# Patient Record
Sex: MALE | Race: WHITE | NOT HISPANIC OR LATINO | Employment: FULL TIME | URBAN - METROPOLITAN AREA
[De-identification: names, ages, dates, MRNs, and addresses within clinical notes are randomized per-mention and may not be internally consistent; named-entity substitution may affect disease eponyms.]

---

## 2020-11-16 ENCOUNTER — OFFICE VISIT (OUTPATIENT)
Dept: DERMATOLOGY | Facility: CLINIC | Age: 35
End: 2020-11-16
Payer: COMMERCIAL

## 2020-11-16 VITALS — TEMPERATURE: 97.9 F | WEIGHT: 236.4 LBS

## 2020-11-16 DIAGNOSIS — L72.0 EPIDERMAL INCLUSION CYST: Primary | ICD-10-CM

## 2020-11-16 PROCEDURE — 11900 INJECT SKIN LESIONS </W 7: CPT | Performed by: DERMATOLOGY

## 2020-11-16 PROCEDURE — 99203 OFFICE O/P NEW LOW 30 MIN: CPT | Performed by: DERMATOLOGY

## 2020-11-16 RX ORDER — TRIAMCINOLONE ACETONIDE 40 MG/ML
10 INJECTION, SUSPENSION INTRA-ARTICULAR; INTRAMUSCULAR ONCE
Status: COMPLETED | OUTPATIENT
Start: 2020-11-16 | End: 2020-11-16

## 2020-11-16 RX ADMIN — TRIAMCINOLONE ACETONIDE 10 MG: 40 INJECTION, SUSPENSION INTRA-ARTICULAR; INTRAMUSCULAR at 16:14

## 2020-11-17 ENCOUNTER — TELEPHONE (OUTPATIENT)
Dept: DERMATOLOGY | Facility: CLINIC | Age: 35
End: 2020-11-17

## 2020-11-17 DIAGNOSIS — L72.0 EPIDERMAL INCLUSION CYST: Primary | ICD-10-CM

## 2020-11-17 RX ORDER — DOXYCYCLINE 100 MG/1
100 TABLET ORAL 2 TIMES DAILY
Qty: 20 TABLET | Refills: 0 | Status: SHIPPED | OUTPATIENT
Start: 2020-11-17 | End: 2020-11-27

## 2020-11-20 ENCOUNTER — TELEPHONE (OUTPATIENT)
Dept: DERMATOLOGY | Facility: CLINIC | Age: 35
End: 2020-11-20

## 2020-12-09 ENCOUNTER — PROCEDURE VISIT (OUTPATIENT)
Dept: DERMATOLOGY | Facility: CLINIC | Age: 35
End: 2020-12-09
Payer: COMMERCIAL

## 2020-12-09 VITALS — WEIGHT: 232 LBS | TEMPERATURE: 99.1 F

## 2020-12-09 DIAGNOSIS — L72.0 EPIDERMAL INCLUSION CYST: Primary | ICD-10-CM

## 2020-12-09 PROCEDURE — 11443 EXC FACE-MM B9+MARG 2.1-3 CM: CPT | Performed by: DERMATOLOGY

## 2020-12-09 PROCEDURE — 88304 TISSUE EXAM BY PATHOLOGIST: CPT | Performed by: STUDENT IN AN ORGANIZED HEALTH CARE EDUCATION/TRAINING PROGRAM

## 2020-12-09 PROCEDURE — 12052 INTMD RPR FACE/MM 2.6-5.0 CM: CPT | Performed by: DERMATOLOGY

## 2020-12-15 ENCOUNTER — OFFICE VISIT (OUTPATIENT)
Dept: DERMATOLOGY | Facility: CLINIC | Age: 35
End: 2020-12-15

## 2020-12-15 DIAGNOSIS — Z48.02 ENCOUNTER FOR REMOVAL OF SUTURES: ICD-10-CM

## 2020-12-15 DIAGNOSIS — L72.0 EPIDERMAL INCLUSION CYST: Primary | ICD-10-CM

## 2020-12-15 PROCEDURE — RECHECK: Performed by: DERMATOLOGY

## 2020-12-15 NOTE — PROGRESS NOTES
Suture removal    Date/Time: 12/15/2020 3:27 PM  Performed by: Everlene Spurling  Authorized by: Gabriel Noble MD   Universal Protocol:  Consent: Verbal consent obtained  Written consent not obtained  Consent given by: patient  Timeout called at: 12/15/2020 3:27 PM   Patient understanding: patient states understanding of the procedure being performed  Patient consent: the patient's understanding of the procedure matches consent given  Procedure consent: procedure consent matches procedure scheduled  Patient identity confirmed: verbally with patient        Patient location:  Clinic  Location:     Laterality:  Left    Location:  [address] [place] [address] location:  [address] [place] [address] location:   cheek  Procedure details: Tools used:  Suture removal kit    Wound appearance:  No sign(s) of infection, good wound healing and clean    Number of sutures removed:  1    Number of staples removed:  0  Post-procedure details:     Post-removal:  Antibiotic ointment applied    Patient tolerance of procedure:   Tolerated well, no immediate complications      Scribe Attestation    I,:  Everlene Spurling am acting as a scribe while in the presence of the attending physician :       I,:  Gabriel Noble MD personally performed the services described in this documentation    as scribed in my presence :

## 2021-08-06 DIAGNOSIS — Z31.9 INFERTILITY MANAGEMENT: Primary | ICD-10-CM

## 2021-08-09 ENCOUNTER — APPOINTMENT (OUTPATIENT)
Dept: LAB | Facility: HOSPITAL | Age: 36
End: 2021-08-09
Attending: STUDENT IN AN ORGANIZED HEALTH CARE EDUCATION/TRAINING PROGRAM
Payer: COMMERCIAL

## 2021-08-09 DIAGNOSIS — Z31.9 INFERTILITY MANAGEMENT: ICD-10-CM

## 2021-08-09 LAB
B ABORTUS AB SMN QL AGGL: ABNORMAL
COLLECTION DATE & TIME: ABNORMAL
LIQUEFACTION TIME SMN: 45 MIN
PH SMN: 8.1 [PH] (ref 7.2–8.6)
SEX ABSTIN DURATION TIME PATIENT: 1 D
SPECIMEN VOL SMN: 2.3 ML (ref 1–5)
SPERM # SMN: 349.6 MILLION/EJACULATION (ref 40–1000)
SPERM AMORPHOUS HEAD NFR SMN: 3 %
SPERM MORPH PNL SMN: 17
SPERM MOTILE SMN QL MICRO: 68 %
SPERM MOTILITY SCORE SMN AUTO: 4 (ref 2–4)
SPERM PROG NFR SMN: 4 % (ref 2–4)
SPERM SMN: 0 %
SPERM SMN: 1 %
SPERM SMN: 1 %
SPERM SMN: 11 %
SPERM SMN: 14 %
SPERM SMN: 152 /ML (ref 20–999)
SPERM SMN: 3 %
SPERM SMN: 4 %
SPERM SMN: 43 % (ref 0–50)
SPERM SMN: 57 % (ref 50–100)
SPERM SMN: 6 %
VISC SMN: 4 CP (ref 3–4)
WBC SMN QL: 1 "HPF"

## 2021-08-09 PROCEDURE — 89320 SEMEN ANAL VOL/COUNT/MOT: CPT

## 2021-08-17 ENCOUNTER — TELEPHONE (OUTPATIENT)
Dept: OBGYN CLINIC | Facility: CLINIC | Age: 36
End: 2021-08-17

## 2021-08-17 NOTE — TELEPHONE ENCOUNTER
Called Kan to review semen analysis  Overall nothing concerning  Would recommend based on his and Julies evaluations that they have a consultation with COURTNEY to discuss what options they may have for pregnancy

## 2021-09-22 ENCOUNTER — OFFICE VISIT (OUTPATIENT)
Dept: INTERNAL MEDICINE CLINIC | Facility: CLINIC | Age: 36
End: 2021-09-22
Payer: COMMERCIAL

## 2021-09-22 VITALS
RESPIRATION RATE: 14 BRPM | DIASTOLIC BLOOD PRESSURE: 68 MMHG | TEMPERATURE: 97.4 F | OXYGEN SATURATION: 97 % | SYSTOLIC BLOOD PRESSURE: 114 MMHG | BODY MASS INDEX: 34.96 KG/M2 | HEIGHT: 69 IN | HEART RATE: 71 BPM | WEIGHT: 236 LBS

## 2021-09-22 DIAGNOSIS — Z13.6 ENCOUNTER FOR LIPID SCREENING FOR CARDIOVASCULAR DISEASE: ICD-10-CM

## 2021-09-22 DIAGNOSIS — G89.29 CHRONIC PAIN OF RIGHT ANKLE: Primary | ICD-10-CM

## 2021-09-22 DIAGNOSIS — M25.571 CHRONIC PAIN OF RIGHT ANKLE: Primary | ICD-10-CM

## 2021-09-22 DIAGNOSIS — J45.20 MILD INTERMITTENT ASTHMA, UNSPECIFIED WHETHER COMPLICATED: ICD-10-CM

## 2021-09-22 DIAGNOSIS — Z13.220 ENCOUNTER FOR LIPID SCREENING FOR CARDIOVASCULAR DISEASE: ICD-10-CM

## 2021-09-22 DIAGNOSIS — M71.21 SYNOVIAL CYST OF RIGHT POPLITEAL SPACE: ICD-10-CM

## 2021-09-22 PROCEDURE — 1036F TOBACCO NON-USER: CPT | Performed by: INTERNAL MEDICINE

## 2021-09-22 PROCEDURE — 99203 OFFICE O/P NEW LOW 30 MIN: CPT | Performed by: INTERNAL MEDICINE

## 2021-09-22 PROCEDURE — 3725F SCREEN DEPRESSION PERFORMED: CPT | Performed by: INTERNAL MEDICINE

## 2021-09-22 PROCEDURE — 3008F BODY MASS INDEX DOCD: CPT | Performed by: INTERNAL MEDICINE

## 2021-09-22 NOTE — PROGRESS NOTES
BMI Counseling: Body mass index is 34 85 kg/m²  The BMI is above normal  Nutrition recommendations include encouraging healthy choices of fruits and vegetables  Rationale for BMI follow-up plan is due to patient being overweight or obese  Depression Screening and Follow-up Plan:   Patient was screened for depression during today's encounter  They screened negative with a PHQ-2 score of 0  Assessment/Plan:    F/u in 1 year and prn  No labs ordered at this visit  Patient prefers to have labs in the future  No problem-specific Assessment & Plan notes found for this encounter  Diagnoses and all orders for this visit:    Chronic pain of right ankle  -     XR ankle 3+ vw right; Future    Encounter for lipid screening for cardiovascular disease    Synovial cyst of right popliteal space  -     US MSK limited; Future    Mild intermittent asthma, unspecified whether complicated          Subjective:      Patient ID: Sehrie Ramirez is a 39 y o  male  Sherie Ramirez is a 39 y o  male who presents with right ankle pain  Onset of the symptoms was several years ago  Inciting event: none known  Current symptoms include: ability to bear weight, but with some pain, pain at the anterior and lateral aspect of the ankle and worsening symptoms after a period of inactivity  Aggravating factors: inactivity, standing after rest and weight bearing  Symptoms have waxed and waned but are worse overall  Patient has had no prior ankle problems  Evaluation to date: none  Treatment to date: brace which is effective  Patient also is here with right lower leg pain  Onset of the symptoms was sudden, starting about 1 month ago  Pain is currently located to behind of the knee  Pain is described as sharp and uncomfortable, with intensity at worst 5 on a 0-10 pain scale  The pain is intermittent and occurs multiple times per day, lasting 15 minutes  Associated  symptoms include: decreased range of motion   Symptoms have progressed to a point and plateaued and gradually worsened  Patient has had no prior leg problems  Evaluation to date: none  Treatment to date: avoidance of offending activity  Past musculoskeletal history: negative for previous injuries or other musculoskeletal conditions  The following portions of the patient's history were reviewed and updated as appropriate:   He  has a past medical history of Mild asthma  He There are no problems to display for this patient  He  has a past surgical history that includes Knee surgery (Left)  His family history is not on file  He  reports that he has never smoked  He has never used smokeless tobacco  He reports current alcohol use  He reports previous drug use  No current outpatient medications on file  No current facility-administered medications for this visit  No current outpatient medications on file prior to visit  No current facility-administered medications on file prior to visit  He has No Known Allergies       Review of Systems   Musculoskeletal:        Right ankle pain  Right posterior lower leg pain   All other systems reviewed and are negative  Objective:      /68 (BP Location: Right arm, Patient Position: Sitting, Cuff Size: Large)   Pulse 71   Temp (!) 97 4 °F (36 3 °C) (Tympanic)   Resp 14   Ht 5' 9" (1 753 m)   Wt 107 kg (236 lb)   SpO2 97%   BMI 34 85 kg/m²          Physical Exam  Vitals and nursing note reviewed  Constitutional:       Appearance: Normal appearance  HENT:      Head: Normocephalic and atraumatic  Right Ear: Tympanic membrane normal       Left Ear: Tympanic membrane normal    Cardiovascular:      Rate and Rhythm: Normal rate and regular rhythm  Heart sounds: Normal heart sounds  Pulmonary:      Effort: Pulmonary effort is normal       Breath sounds: Normal breath sounds  Abdominal:      General: Bowel sounds are normal       Palpations: Abdomen is soft     Musculoskeletal: General: Signs of injury present  No swelling or tenderness  Normal range of motion  Cervical back: Normal range of motion  Right lower leg: No edema  Left lower leg: No edema  Neurological:      General: No focal deficit present  Mental Status: He is alert and oriented to person, place, and time  Mental status is at baseline  Psychiatric:         Mood and Affect: Mood normal          Behavior: Behavior normal          Thought Content:  Thought content normal          Judgment: Judgment normal

## 2021-10-04 ENCOUNTER — HOSPITAL ENCOUNTER (OUTPATIENT)
Dept: RADIOLOGY | Facility: HOSPITAL | Age: 36
Discharge: HOME | End: 2021-10-04
Attending: INTERNAL MEDICINE
Payer: COMMERCIAL

## 2021-10-04 DIAGNOSIS — G89.29 CHRONIC PAIN OF RIGHT ANKLE: ICD-10-CM

## 2021-10-04 DIAGNOSIS — M25.571 CHRONIC PAIN OF RIGHT ANKLE: ICD-10-CM

## 2021-10-04 PROCEDURE — 73610 X-RAY EXAM OF ANKLE: CPT

## 2021-10-08 ENCOUNTER — TELEPHONE (OUTPATIENT)
Dept: INTERNAL MEDICINE CLINIC | Facility: CLINIC | Age: 36
End: 2021-10-08

## 2021-11-30 ENCOUNTER — APPOINTMENT (OUTPATIENT)
Dept: URGENT CARE | Age: 36
End: 2021-11-30
Payer: OTHER MISCELLANEOUS

## 2021-11-30 ENCOUNTER — APPOINTMENT (OUTPATIENT)
Dept: RADIOLOGY | Age: 36
End: 2021-11-30
Payer: COMMERCIAL

## 2021-11-30 DIAGNOSIS — S69.91XA INJURY OF RIGHT WRIST, INITIAL ENCOUNTER: Primary | ICD-10-CM

## 2021-11-30 DIAGNOSIS — S69.91XA INJURY OF RIGHT WRIST, INITIAL ENCOUNTER: ICD-10-CM

## 2021-11-30 PROCEDURE — 73110 X-RAY EXAM OF WRIST: CPT

## 2021-11-30 PROCEDURE — G0382 LEV 3 HOSP TYPE B ED VISIT: HCPCS | Performed by: NURSE PRACTITIONER

## 2021-11-30 PROCEDURE — 99283 EMERGENCY DEPT VISIT LOW MDM: CPT | Performed by: NURSE PRACTITIONER

## 2021-12-07 ENCOUNTER — APPOINTMENT (OUTPATIENT)
Dept: URGENT CARE | Age: 36
End: 2021-12-07
Payer: OTHER MISCELLANEOUS

## 2021-12-07 PROCEDURE — 99213 OFFICE O/P EST LOW 20 MIN: CPT | Performed by: STUDENT IN AN ORGANIZED HEALTH CARE EDUCATION/TRAINING PROGRAM

## 2022-01-12 ENCOUNTER — APPOINTMENT (OUTPATIENT)
Dept: URGENT CARE | Age: 37
End: 2022-01-12

## 2022-08-10 ENCOUNTER — APPOINTMENT (OUTPATIENT)
Dept: URGENT CARE | Age: 37
End: 2022-08-10

## 2022-10-03 ENCOUNTER — OFFICE VISIT (OUTPATIENT)
Dept: INTERNAL MEDICINE CLINIC | Facility: CLINIC | Age: 37
End: 2022-10-03
Payer: COMMERCIAL

## 2022-10-03 VITALS
DIASTOLIC BLOOD PRESSURE: 70 MMHG | WEIGHT: 222 LBS | RESPIRATION RATE: 16 BRPM | HEIGHT: 70 IN | BODY MASS INDEX: 31.78 KG/M2 | TEMPERATURE: 98.5 F | OXYGEN SATURATION: 97 % | SYSTOLIC BLOOD PRESSURE: 118 MMHG | HEART RATE: 77 BPM

## 2022-10-03 DIAGNOSIS — Z11.59 ENCOUNTER FOR HEPATITIS C SCREENING TEST FOR LOW RISK PATIENT: ICD-10-CM

## 2022-10-03 DIAGNOSIS — Z00.00 ANNUAL PHYSICAL EXAM: Primary | ICD-10-CM

## 2022-10-03 DIAGNOSIS — Z11.4 SCREENING FOR HIV WITHOUT PRESENCE OF RISK FACTORS: ICD-10-CM

## 2022-10-03 DIAGNOSIS — H81.13 BENIGN PAROXYSMAL POSITIONAL VERTIGO DUE TO BILATERAL VESTIBULAR DISORDER: ICD-10-CM

## 2022-10-03 DIAGNOSIS — J45.20 MILD INTERMITTENT ASTHMA, UNSPECIFIED WHETHER COMPLICATED: ICD-10-CM

## 2022-10-03 DIAGNOSIS — R42 DIZZINESS AFTER EXTENSION OF NECK: ICD-10-CM

## 2022-10-03 DIAGNOSIS — G47.33 OSA (OBSTRUCTIVE SLEEP APNEA): ICD-10-CM

## 2022-10-03 PROCEDURE — 99214 OFFICE O/P EST MOD 30 MIN: CPT | Performed by: INTERNAL MEDICINE

## 2022-10-03 PROCEDURE — 99395 PREV VISIT EST AGE 18-39: CPT | Performed by: INTERNAL MEDICINE

## 2022-10-03 NOTE — PROGRESS NOTES
[facility]NAL MEDICINE [place]    NAME: Kan Burgess  AGE: 40 y o  SEX: male  : 1985     DATE: 10/3/2022     Assessment and Plan:     Problem List Items Addressed This Visit        Respiratory    ERICA (obstructive sleep apnea)    Relevant Orders    CBC and differential    Lipid Panel with Direct LDL reflex    Hemoglobin A1C    Mild intermittent asthma    Relevant Orders    TSH, 3rd generation with Free T4 reflex    Comprehensive metabolic panel       Nervous and Auditory    Benign paroxysmal positional vertigo due to bilateral vestibular disorder      Other Visit Diagnoses     Annual physical exam    -  Primary    Dizziness after extension of neck        Screening for HIV without presence of risk factors        Relevant Orders    HIV 1/2 Antigen/Antibody (4th Generation) w Reflex [facility]    Encounter for hepatitis C screening test for low risk patient        Relevant Orders    Hepatitis C antibody          Immunizations and preventive care screenings were discussed with patient today  Appropriate education was printed on patient's after visit summary  Counseling:  Alcohol/drug use: discussed moderation in alcohol intake, the recommendations for healthy alcohol use, and avoidance of illicit drug use  Dental Health: discussed importance of regular tooth brushing, flossing, and dental visits  Injury prevention: discussed safety/seat belts, safety helmets, smoke detectors, carbon dioxide detectors, and smoking near bedding or upholstery  Sexual health: discussed sexually transmitted diseases, partner selection, use of condoms, avoidance of unintended pregnancy, and contraceptive alternatives  · Exercise: the importance of regular exercise/physical activity was discussed  Recommend exercise 3-5 times per week for at least 30 minutes  BMI Counseling: Body mass index is 31 85 kg/m²   The BMI is above normal  Nutrition recommendations include decreasing portion sizes and encouraging healthy choices of fruits and vegetables  Exercise recommendations include moderate physical activity 150 minutes/week  Rationale for BMI follow-up plan is due to patient being overweight or obese  Depression Screening and Follow-up Plan: Patient was screened for depression during today's encounter  They screened negative with a PHQ-2 score of 0  Return in about 1 year (around 10/3/2023)  Chief Complaint:     Chief Complaint   Patient presents with    Annual Exam      History of Present Illness:     Adult Annual Physical   Patient here for a comprehensive physical exam  The patient reports no problems  Diet and Physical Activity  · Diet/Nutrition: well balanced diet  · Exercise: moderate cardiovascular exercise  Depression Screening  PHQ-2/9 Depression Screening    Little interest or pleasure in doing things: 0 - not at all  Feeling down, depressed, or hopeless: 0 - not at all  PHQ-2 Score: 0  PHQ-2 Interpretation: Negative depression screen       General Health  · Sleep: sleeps well  · Hearing: normal - bilateral   · Vision: no vision problems  · Dental: regular dental visits  [facility]  · History of STDs?: no      Review of Systems:     Review of Systems   Musculoskeletal: Positive for arthralgias  Allergic/Immunologic: Positive for environmental allergies  Neurological: Positive for dizziness  All other systems reviewed and are negative       Past Medical History:     Past Medical History:   Diagnosis Date    Mild asthma       Past Surgical History:     Past Surgical History:   Procedure Laterality Date    KNEE SURGERY Left       Social History:     Social History     Socioeconomic History    Marital status: /Civil Union     Spouse name: None    Number of children: None    Years of education: None    Highest education level: None   Occupational History    None   Tobacco Use    Smoking status: Never Smoker    Smokeless tobacco: Never Used   Vaping Use    Vaping Use: Never used   Substance and Sexual Activity    Alcohol use: Yes    Drug use: Not Currently    Sexual activity: None   Other Topics Concern    None   Social History Narrative    None     Social Determinants of Health     Financial Resource Strain: Not on file   Food Insecurity: Not on file   Transportation Needs: Not on file   Physical Activity: Not on file   Stress: Not on file   Social Connections: Not on file   Intimate Partner Violence: Not on file   Housing Stability: Not on file      Family History:     History reviewed  No pertinent family history  Current Medications:     No current outpatient medications on file  No current facility-administered medications for this visit  Allergies:     No Known Allergies   Physical Exam:     /70 (BP Location: Left arm, Patient Position: Sitting, Cuff Size: Standard)   Pulse 77   Temp 98 5 °F (36 9 °C) (Tympanic)   Resp 16   Ht 5' 10" (1 778 m)   Wt 101 kg (222 lb)   SpO2 97%   BMI 31 85 kg/m²     Physical Exam  Vitals and nursing note reviewed  Constitutional:       Appearance: Normal appearance  HENT:      Head: Normocephalic and atraumatic  Cardiovascular:      Rate and Rhythm: Normal rate and regular rhythm  Heart sounds: Normal heart sounds  Pulmonary:      Effort: Pulmonary effort is normal       Breath sounds: Normal breath sounds  Abdominal:      Palpations: Abdomen is soft  Musculoskeletal:         General: Normal range of motion  Cervical back: Normal range of motion  Right lower leg: No edema  Left lower leg: No edema  Lymphadenopathy:      Cervical: No cervical adenopathy  Skin:     General: Skin is warm and dry  Neurological:      General: No focal deficit present  Mental Status: He is alert and oriented to person, place, and time  Mental status is at baseline     Psychiatric:         Mood and Affect: Mood normal  Ion Crandall MD   [facility] INTERNAL MEDICINE [place]

## 2022-10-03 NOTE — PROGRESS NOTES
Assessment/Plan:     Patient is a pleasant 59-year-old male  He is here for physical   He has been diagnosed with sleep apnea and is using CPAP at night  Most recent complaint is that she he has been having dizziness when he moves his head quickly when he lowers his head to tie his issues  Denies any nausea vomiting  Discussed the Epley maneuver  He will call me if he is interested in doing it  Also discussed obtaining lab work  Labs ordered  Quality Measures:     Depression Screening and Follow-up Plan: Patient was screened for depression during today's encounter  They screened negative with a PHQ-2 score of 0  Return in about 1 year (around 10/3/2023)  No problem-specific Assessment & Plan notes found for this encounter  Diagnoses and all orders for this visit:    Annual physical exam    ERICA (obstructive sleep apnea)  -     CBC and differential; Future  -     Lipid Panel with Direct LDL reflex; Future  -     Hemoglobin A1C; Future    Mild intermittent asthma, unspecified whether complicated  -     TSH, 3rd generation with Free T4 reflex; Future  -     Comprehensive metabolic panel; Future    Dizziness after extension of neck    Screening for HIV without presence of risk factors  -     HIV 1/2 Antigen/Antibody (4th Generation) w Reflex [facility]; Future    Encounter for hepatitis C screening test for low risk patient  -     Hepatitis C antibody; Future    Benign paroxysmal positional vertigo due to bilateral vestibular disorder          Subjective:      Patient ID: Queen Rob is a 40 y o  male  Patient is here for physical       ALLERGIES:  No Known Allergies    CURRENT MEDICATIONS:  No current outpatient medications on file      ACTIVE PROBLEM LIST:  Patient Active Problem List   Diagnosis    ERICA (obstructive sleep apnea)    Mild intermittent asthma    Benign paroxysmal positional vertigo due to bilateral vestibular disorder       PAST MEDICAL HISTORY:  Past Medical History: Diagnosis Date    Mild asthma        PAST SURGICAL HISTORY:  Past Surgical History:   Procedure Laterality Date    KNEE SURGERY Left        FAMILY HISTORY:  History reviewed  No pertinent family history  SOCIAL HISTORY:  Social History     Socioeconomic History    Marital status: /Civil Union     Spouse name: Not on file    Number of children: Not on file    Years of education: Not on file    Highest education level: Not on file   Occupational History    Not on file   Tobacco Use    Smoking status: Never Smoker    Smokeless tobacco: Never Used   Vaping Use    Vaping Use: Never used   Substance and Sexual Activity    Alcohol use: Yes    Drug use: Not Currently    Sexual activity: Not on file   Other Topics Concern    Not on file   Social History Narrative    Not on file     Social Determinants of Health     Financial Resource Strain: Not on file   Food Insecurity: Not on file   Transportation Needs: Not on file   Physical Activity: Not on file   Stress: Not on file   Social Connections: Not on file   Intimate Partner Violence: Not on file   Housing Stability: Not on file       Review of Systems   Musculoskeletal: Positive for arthralgias  Neurological: Positive for dizziness  All other systems reviewed and are negative  Objective:  Vitals:    10/03/22 1524   BP: 118/70   BP Location: Left arm   Patient Position: Sitting   Cuff Size: Standard   Pulse: 77   Resp: 16   Temp: 98 5 °F (36 9 °C)   TempSrc: Tympanic   SpO2: 97%   Weight: 101 kg (222 lb)   Height: 5' 10" (1 778 m)     Body mass index is 31 85 kg/m²  Physical Exam  Vitals and nursing note reviewed  Constitutional:       Appearance: Normal appearance  HENT:      Head: Normocephalic and atraumatic  Right Ear: Tympanic membrane normal       Left Ear: Tympanic membrane normal    Cardiovascular:      Rate and Rhythm: Normal rate and regular rhythm  Heart sounds: Normal heart sounds     Pulmonary:      Effort: Pulmonary effort is normal       Breath sounds: Normal breath sounds  Abdominal:      General: Abdomen is flat  Bowel sounds are normal       Palpations: Abdomen is soft  Musculoskeletal:         General: Normal range of motion  Cervical back: Normal range of motion  Right lower leg: No edema  Left lower leg: No edema  Lymphadenopathy:      Cervical: No cervical adenopathy  Skin:     General: Skin is warm and dry  Neurological:      General: No focal deficit present  Mental Status: He is alert and oriented to person, place, and time  Mental status is at baseline  Cranial Nerves: No cranial nerve deficit  Motor: No weakness  Gait: Gait normal    Psychiatric:         Mood and Affect: Mood normal            RESULTS:    No results found for this or any previous visit (from the past 1008 hour(s))  This note was created with voice recognition software  Phonic, grammatical and spelling errors may be present within the note as a result

## 2022-10-03 NOTE — PATIENT INSTRUCTIONS

## 2022-10-06 ENCOUNTER — APPOINTMENT (OUTPATIENT)
Dept: LAB | Facility: HOSPITAL | Age: 37
End: 2022-10-06
Payer: COMMERCIAL

## 2022-10-06 DIAGNOSIS — Z11.59 ENCOUNTER FOR HEPATITIS C SCREENING TEST FOR LOW RISK PATIENT: ICD-10-CM

## 2022-10-06 DIAGNOSIS — J45.20 MILD INTERMITTENT ASTHMA, UNSPECIFIED WHETHER COMPLICATED: ICD-10-CM

## 2022-10-06 DIAGNOSIS — Z11.4 SCREENING FOR HIV WITHOUT PRESENCE OF RISK FACTORS: ICD-10-CM

## 2022-10-06 DIAGNOSIS — G47.33 OSA (OBSTRUCTIVE SLEEP APNEA): ICD-10-CM

## 2022-10-06 LAB
ALBUMIN SERPL BCP-MCNC: 4.2 G/DL (ref 3.5–5)
ALP SERPL-CCNC: 47 U/L (ref 46–116)
ALT SERPL W P-5'-P-CCNC: 36 U/L (ref 12–78)
ANION GAP SERPL CALCULATED.3IONS-SCNC: 5 MMOL/L (ref 4–13)
AST SERPL W P-5'-P-CCNC: 30 U/L (ref 5–45)
BASOPHILS # BLD AUTO: 0.01 THOUSANDS/ΜL (ref 0–0.1)
BASOPHILS NFR BLD AUTO: 0 % (ref 0–1)
BILIRUB SERPL-MCNC: 1.15 MG/DL (ref 0.2–1)
BUN SERPL-MCNC: 12 MG/DL (ref 5–25)
CALCIUM SERPL-MCNC: 8.9 MG/DL (ref 8.3–10.1)
CHLORIDE SERPL-SCNC: 105 MMOL/L (ref 96–108)
CHOLEST SERPL-MCNC: 104 MG/DL
CO2 SERPL-SCNC: 27 MMOL/L (ref 21–32)
CREAT SERPL-MCNC: 1.08 MG/DL (ref 0.6–1.3)
EOSINOPHIL # BLD AUTO: 0.04 THOUSAND/ΜL (ref 0–0.61)
EOSINOPHIL NFR BLD AUTO: 1 % (ref 0–6)
ERYTHROCYTE [DISTWIDTH] IN BLOOD BY AUTOMATED COUNT: 12.4 % (ref 11.6–15.1)
EST. AVERAGE GLUCOSE BLD GHB EST-MCNC: 103 MG/DL
GFR SERPL CREATININE-BSD FRML MDRD: 87 ML/MIN/1.73SQ M
GLUCOSE P FAST SERPL-MCNC: 79 MG/DL (ref 65–99)
HBA1C MFR BLD: 5.2 %
HCT VFR BLD AUTO: 43.8 % (ref 36.5–49.3)
HCV AB SER QL: NORMAL
HDLC SERPL-MCNC: 43 MG/DL
HGB BLD-MCNC: 14.6 G/DL (ref 12–17)
IMM GRANULOCYTES # BLD AUTO: 0.01 THOUSAND/UL (ref 0–0.2)
IMM GRANULOCYTES NFR BLD AUTO: 0 % (ref 0–2)
LDLC SERPL CALC-MCNC: 51 MG/DL (ref 0–100)
LYMPHOCYTES # BLD AUTO: 1.12 THOUSANDS/ΜL (ref 0.6–4.47)
LYMPHOCYTES NFR BLD AUTO: 39 % (ref 14–44)
MCH RBC QN AUTO: 29.5 PG (ref 26.8–34.3)
MCHC RBC AUTO-ENTMCNC: 33.3 G/DL (ref 31.4–37.4)
MCV RBC AUTO: 89 FL (ref 82–98)
MONOCYTES # BLD AUTO: 0.23 THOUSAND/ΜL (ref 0.17–1.22)
MONOCYTES NFR BLD AUTO: 8 % (ref 4–12)
NEUTROPHILS # BLD AUTO: 1.45 THOUSANDS/ΜL (ref 1.85–7.62)
NEUTS SEG NFR BLD AUTO: 52 % (ref 43–75)
NRBC BLD AUTO-RTO: 0 /100 WBCS
PLATELET # BLD AUTO: 192 THOUSANDS/UL (ref 149–390)
PMV BLD AUTO: 11.1 FL (ref 8.9–12.7)
POTASSIUM SERPL-SCNC: 4.3 MMOL/L (ref 3.5–5.3)
PROT SERPL-MCNC: 7.4 G/DL (ref 6.4–8.4)
RBC # BLD AUTO: 4.95 MILLION/UL (ref 3.88–5.62)
SODIUM SERPL-SCNC: 137 MMOL/L (ref 135–147)
TRIGL SERPL-MCNC: 48 MG/DL
TSH SERPL DL<=0.05 MIU/L-ACNC: 1.38 UIU/ML (ref 0.45–4.5)
WBC # BLD AUTO: 2.86 THOUSAND/UL (ref 4.31–10.16)

## 2022-10-06 PROCEDURE — 80061 LIPID PANEL: CPT

## 2022-10-06 PROCEDURE — 85025 COMPLETE CBC W/AUTO DIFF WBC: CPT

## 2022-10-06 PROCEDURE — 83036 HEMOGLOBIN GLYCOSYLATED A1C: CPT

## 2022-10-06 PROCEDURE — 86803 HEPATITIS C AB TEST: CPT

## 2022-10-06 PROCEDURE — 36415 COLL VENOUS BLD VENIPUNCTURE: CPT

## 2022-10-06 PROCEDURE — 80053 COMPREHEN METABOLIC PANEL: CPT

## 2022-10-06 PROCEDURE — 84443 ASSAY THYROID STIM HORMONE: CPT

## 2022-10-06 PROCEDURE — 87389 HIV-1 AG W/HIV-1&-2 AB AG IA: CPT

## 2022-10-07 LAB — HIV 1+2 AB+HIV1 P24 AG SERPL QL IA: NORMAL

## 2022-12-08 ENCOUNTER — TELEPHONE (OUTPATIENT)
Dept: PSYCHIATRY | Facility: CLINIC | Age: 37
End: 2022-12-08

## 2022-12-08 NOTE — TELEPHONE ENCOUNTER
Patient has been added to the non referral  wait list  Confirmed insurance, needs of service, and location preferences

## 2023-05-03 ENCOUNTER — OFFICE VISIT (OUTPATIENT)
Dept: INTERNAL MEDICINE CLINIC | Facility: CLINIC | Age: 38
End: 2023-05-03

## 2023-05-03 VITALS
HEIGHT: 70 IN | RESPIRATION RATE: 16 BRPM | SYSTOLIC BLOOD PRESSURE: 116 MMHG | OXYGEN SATURATION: 98 % | TEMPERATURE: 98.3 F | WEIGHT: 239.2 LBS | HEART RATE: 71 BPM | DIASTOLIC BLOOD PRESSURE: 70 MMHG | BODY MASS INDEX: 34.24 KG/M2

## 2023-05-03 DIAGNOSIS — R30.1 PAINFUL BLADDER SPASM: Primary | ICD-10-CM

## 2023-05-03 LAB
SL AMB  POCT GLUCOSE, UA: NORMAL
SL AMB LEUKOCYTE ESTERASE,UA: NORMAL
SL AMB POCT BILIRUBIN,UA: NORMAL
SL AMB POCT BLOOD,UA: NORMAL
SL AMB POCT CLARITY,UA: NORMAL
SL AMB POCT COLOR,UA: NORMAL
SL AMB POCT KETONES,UA: NORMAL
SL AMB POCT NITRITE,UA: NORMAL
SL AMB POCT PH,UA: 8
SL AMB POCT SPECIFIC GRAVITY,UA: 1.02
SL AMB POCT URINE PROTEIN: NORMAL
SL AMB POCT UROBILINOGEN: 3.2

## 2023-05-03 RX ORDER — NITROFURANTOIN 25; 75 MG/1; MG/1
100 CAPSULE ORAL 2 TIMES DAILY
Qty: 10 CAPSULE | Refills: 0 | Status: SHIPPED | OUTPATIENT
Start: 2023-05-03 | End: 2023-05-08

## 2023-05-03 NOTE — PROGRESS NOTES
Assessment/Plan:     Karla Serrano is here with c/o bladder spasms; started one month ago; worse when sitting down; never had this pain before; penis testes within normal limits; shock type pain; hours, comes and goes; will have him see urology, obtain kidney/bladder US; UA negative in the office but will send out for Cx; [address] as a UTI for now  No CV tenderness noted; abdomen benign  Quality Measures:     BMI Counseling: Body mass index is 34 32 kg/m²  The BMI is above normal  Nutrition recommendations include decreasing portion sizes and encouraging healthy choices of fruits and vegetables  Exercise recommendations include moderate physical activity 150 minutes/week  Rationale for BMI follow-up plan is due to patient being overweight or obese  Depression Screening and Follow-up Plan: Patient was screened for depression during today's encounter  They screened negative with a PHQ-2 score of 2  Return for Next scheduled follow up  No problem-specific Assessment & Plan notes found for this encounter  Diagnoses and all orders for this visit:    Painful bladder spasm  -     POCT urine dip  -     US kidney and bladder; Future  -     Urine culture  -     Ambulatory Referral to Urology; Future  -     nitrofurantoin (MACROBID) 100 mg capsule; Take 1 capsule (100 mg total) by mouth 2 (two) times a day for 5 days          Subjective:      Patient ID: Maria C Sharpe is a 40 y o  male  Here for an acute visit        ALLERGIES:  No Known Allergies    CURRENT MEDICATIONS:    Current Outpatient Medications:     nitrofurantoin (MACROBID) 100 mg capsule, Take 1 capsule (100 mg total) by mouth 2 (two) times a day for 5 days, Disp: 10 capsule, Rfl: 0    ACTIVE PROBLEM LIST:  Patient Active Problem List   Diagnosis    ERICA (obstructive sleep apnea)    Mild intermittent asthma    Benign paroxysmal positional vertigo due to bilateral vestibular disorder       PAST MEDICAL HISTORY:  Past Medical History: Diagnosis Date    Mild asthma        PAST SURGICAL HISTORY:  Past Surgical History:   Procedure Laterality Date    KNEE SURGERY Left        FAMILY HISTORY:  Family History   Problem Relation Age of Onset    Breast cancer Maternal Grandmother     Cancer Paternal Grandfather        SOCIAL HISTORY:  Social History     Socioeconomic History    Marital status: /Civil Union     Spouse name: Not on file    Number of children: Not on file    Years of education: Not on file    Highest education level: Not on file   Occupational History    Not on file   Tobacco Use    Smoking status: Former     Packs/day: 0 25     Years: 2 00     Pack years: 0 50     Types: Cigarettes    Smokeless tobacco: Never    Tobacco comments:     Smoked on and off in early twenties  Smoked again recently but have not in several months   Vaping Use    Vaping Use: Never used   Substance and Sexual Activity    Alcohol use: Not Currently    Drug use: Not Currently    Sexual activity: Not on file   Other Topics Concern    Not on file   Social History Narrative    Not on file     Social Determinants of Health     Financial Resource Strain: Not on file   Food Insecurity: Not on file   Transportation Needs: Not on file   Physical Activity: Not on file   Stress: Not on file   Social Connections: Not on file   Intimate Partner Violence: Not on file   Housing Stability: Not on file       Review of Systems   Constitutional: Negative for chills and fever  HENT: Negative for ear pain and sore throat  Eyes: Negative for pain and visual disturbance  Respiratory: Negative for cough and shortness of breath  Cardiovascular: Negative for chest pain and palpitations  Gastrointestinal: Negative for abdominal pain and vomiting  Genitourinary: Negative for difficulty urinating, dysuria, enuresis, flank pain, frequency, genital sores, hematuria, penile discharge, penile pain, penile swelling, scrotal swelling and testicular pain  "Bladder spasms; started one month ago; worse when sitting down; never had this pain before; penis testes within the normal limits; shock type pain; hours, comes and goes;    Musculoskeletal: Negative for arthralgias and back pain  Skin: Negative for color change and rash  Neurological: Negative for seizures and syncope  All other systems reviewed and are negative  Objective:  Vitals:    05/03/23 1434   BP: 116/70   BP Location: Left arm   Patient Position: Sitting   Cuff Size: Large   Pulse: 71   Resp: 16   Temp: 98 3 °F (36 8 °C)   TempSrc: Tympanic   SpO2: 98%   Weight: 109 kg (239 lb 3 2 oz)   Height: 5' 10\" (1 778 m)     Body mass index is 34 32 kg/m²  Physical Exam  Vitals and nursing note reviewed  Constitutional:       Appearance: Normal appearance  HENT:      Head: Normocephalic and atraumatic  Cardiovascular:      Rate and Rhythm: Normal rate  Pulmonary:      Effort: Pulmonary effort is normal    Abdominal:      General: There is no distension  Palpations: Abdomen is soft  There is no mass  Tenderness: There is no abdominal tenderness  Hernia: No hernia is present  Genitourinary:     Penis: Normal        Testes: Normal       Comments: No signs of hernia on exam ; none when coughing or using valsalva manuever  Musculoskeletal:         General: Normal range of motion  Cervical back: Normal range of motion  Skin:     General: Skin is warm and dry  Neurological:      General: No focal deficit present  Mental Status: He is alert and oriented to person, place, and time  Mental status is at baseline  Psychiatric:         Mood and Affect: Mood normal            RESULTS:    In chart    This note was created with voice recognition software  Phonic, grammatical and spelling errors may be present within the note as a result      "

## 2023-05-04 LAB — BACTERIA UR CULT: NORMAL

## 2023-05-09 ENCOUNTER — HOSPITAL ENCOUNTER (OUTPATIENT)
Dept: RADIOLOGY | Facility: HOSPITAL | Age: 38
Discharge: HOME | End: 2023-05-09

## 2023-05-09 DIAGNOSIS — R30.1 PAINFUL BLADDER SPASM: ICD-10-CM

## 2023-06-02 NOTE — PROGRESS NOTES
6/5/2023    Kan Burgess  1985  [id number]      Assessment  -Orchialgia    Discussion/Plan  Wilfred Hopkins is a 40 y o  male who presents in consultation    1  Orchialgia- urine dip in the office today appears negative for infection or blood  No abnormal findings noted on physical examination  We discussed that cause of symptoms likely musculoskeletal in etiology and secondary to prolonged sitting  Recommend patient purchase cushion to sit on  Will also order scrotal ultrasound for further assessment  Reviewed supportive measures  Plan to call patient with results  Call with results of scrotal ultrasound  He was advised to call sooner with any questions or issues      -All questions answered, patient agrees with plan      History of Present Illness  40 y o  male who presents in consultation today for evaluation of orchialgia  He reports symptoms of discomfort in bladder which radiates to penis and scrotum after prolonged sitting  Patient works as truck/, and sits for prolonged periods of time  He states symptoms begin a few months ago and is intermittent  Discomfort last occurred 1-2 weeks ago  He denies any scrotal swelling or tenderness, and reports longstanding history of urinary hesitancy  Patient denies any gross hematuria, dysuria, or additional lower urinary tract symptoms  Urine culture from 5/3/2023 was negative for infection  He underwent renal ultrasound on 5/9/2023 which was unremarkable  Patient also reports chronic back pain  Patient denies any prior urologic history, surgical intervention, instrumentation, or injury/trauma to scrotal region  He denies any strong family history of prostate or testicular malignancy  Review of Systems  Review of Systems   Constitutional: Negative  HENT: Negative  Respiratory: Negative  Cardiovascular: Negative  Gastrointestinal: Negative  Genitourinary: Positive for testicular pain   Negative for decreased urine volume, difficulty urinating, dysuria, flank pain, frequency, hematuria, scrotal swelling and urgency  Musculoskeletal: Negative  Skin: Negative  Neurological: Negative  Psychiatric/Behavioral: Negative        AUA SYMPTOM SCORE    Flowsheet Row Most Recent Value   AUA SYMPTOM SCORE    How often have you had a sensation of not emptying your bladder completely after you finished urinating? 3 (P)     How often have you had to urinate again less than two hours after you finished urinating? 4 (P)     How often have you found you stopped and started again several times when you urinate? 4 (P)     How often have you found it difficult to postpone urination? 4 (P)     How often have you had a weak urinary stream? 0 (P)     How often have you had to push or strain to begin urination? 4 (P)     How many times did you most typically get up to urinate from the time you went to bed at night until the time you got up in the morning? 1 (P)     Quality of Life: If you were to spend the rest of your life with your urinary condition just the way it is now, how would you feel about that? 4 (P)     AUA SYMPTOM SCORE 20 (P)             Past Medical History  Past Medical History:   Diagnosis Date   • Mild asthma        Past Social History  Past Surgical History:   Procedure Laterality Date   • KNEE SURGERY Left        Past Family History  Family History   Problem Relation Age of Onset   • Breast cancer Maternal Grandmother    • Cancer Paternal Grandfather        Past Social history  Social History     Socioeconomic History   • Marital status: /Civil Union     Spouse name: Not on file   • Number of children: Not on file   • Years of education: Not on file   • Highest education level: Not on file   Occupational History   • Not on file   Tobacco Use   • Smoking status: Former     Packs/day: 0 25     Years: 2 00     Total pack years: 0 50     Types: Cigarettes   • Smokeless tobacco: Never   • Tobacco comments:     Smoked on and off in early twenties  Smoked again recently but have not in several months   Vaping Use   • Vaping Use: Never used   Substance and Sexual Activity   • Alcohol use: Not Currently   • Drug use: Not Currently   • Sexual activity: Not on file   Other Topics Concern   • Not on file   Social History Narrative   • Not on file     Social Determinants of Health     Financial Resource Strain: Not on file   Food Insecurity: Not on file   Transportation Needs: Not on file   Physical Activity: Not on file   Stress: Not on file   Social Connections: Not on file   Intimate Partner Violence: Not on file   Housing Stability: Not on file       Current Medications  No current outpatient medications on file  No current facility-administered medications for this visit  Allergies  No Known Allergies    Past Medical History, Social History, Family History, medications and allergies were reviewed  Vitals  There were no vitals filed for this visit  Physical Exam  Physical Exam  Constitutional:       Appearance: Normal appearance  He is well-developed  HENT:      Head: Normocephalic  Eyes:      Pupils: Pupils are equal, round, and reactive to light  Pulmonary:      Effort: Pulmonary effort is normal    Abdominal:      Palpations: Abdomen is soft  Genitourinary:     Testes: Normal       Comments: Penis uncircumcised, testicles descended bilaterally, no scrotal edema or erythema, scrotum nontender, no inguinal hernia appreciated  Musculoskeletal:         General: Normal range of motion  Cervical back: Normal range of motion  Skin:     General: Skin is warm and dry  Neurological:      General: No focal deficit present  Mental Status: He is alert and oriented to person, place, and time  Psychiatric:         Mood and Affect: Mood normal          Behavior: Behavior normal          Thought Content:  Thought content normal          Judgment: Judgment normal          Results    I have personally reviewed "all pertinent lab results and reviewed with patient  No results found for: \"PSA\"  Lab Results   Component Value Date    BUN 12 10/06/2022    CALCIUM 8 9 10/06/2022     10/06/2022    CO2 27 10/06/2022    CREATININE 1 08 10/06/2022    K 4 3 10/06/2022     Lab Results   Component Value Date    HCT 43 8 10/06/2022    HGB 14 6 10/06/2022    MCV 89 10/06/2022     10/06/2022    WBC 2 86 (L) 10/06/2022     No results found for this or any previous visit (from the past 1 hour(s))                          "

## 2023-06-05 ENCOUNTER — OFFICE VISIT (OUTPATIENT)
Dept: UROLOGY | Facility: AMBULATORY SURGERY CENTER | Age: 38
End: 2023-06-05

## 2023-06-05 VITALS
BODY MASS INDEX: 34.22 KG/M2 | DIASTOLIC BLOOD PRESSURE: 70 MMHG | HEIGHT: 70 IN | SYSTOLIC BLOOD PRESSURE: 118 MMHG | WEIGHT: 239 LBS | OXYGEN SATURATION: 99 %

## 2023-06-05 DIAGNOSIS — R30.1 PAINFUL BLADDER SPASM: ICD-10-CM

## 2023-06-05 DIAGNOSIS — N50.819 PAIN IN TESTICLE, UNSPECIFIED LATERALITY: Primary | ICD-10-CM

## 2023-06-05 LAB
SL AMB  POCT GLUCOSE, UA: NORMAL
SL AMB LEUKOCYTE ESTERASE,UA: NORMAL
SL AMB POCT BILIRUBIN,UA: NORMAL
SL AMB POCT BLOOD,UA: NORMAL
SL AMB POCT CLARITY,UA: CLEAR
SL AMB POCT COLOR,UA: YELLOW
SL AMB POCT KETONES,UA: NORMAL
SL AMB POCT NITRITE,UA: NORMAL
SL AMB POCT PH,UA: 8
SL AMB POCT SPECIFIC GRAVITY,UA: 1.01
SL AMB POCT URINE PROTEIN: NORMAL
SL AMB POCT UROBILINOGEN: 0.2

## 2023-06-05 NOTE — PATIENT INSTRUCTIONS
BLADDER HEALTH    WHAT IS CONSIDERED NORMAL? The average bladder can hold about 2 cups of urine before it needs to be emptied  The normal range of voiding urine is 6 to 8 times during a 24 hour period  As we get older, our bladder capacity can get smaller and we may need to pass urine more frequently but usually not more than every 2 hours  Urine should flow easily without discomfort in a good, steady stream until the bladder is empty  No pushing or straining is necessary to empty the bladder  An urge is a signal that you feel as the bladder stretches to fill with urine  Urges can be felt even if the bladder is not full  Urges are not commands to go to the toilet, merely a signal and can be controlled  WHAT ARE GOOD BLADDER HABITS? Take your time when emptying your bladder  Don’t strain or push to empty your bladder  Make sure you empty your bladder completely each time you pass urine  Do not rush the process  Consistently ignoring the urge to go (waiting more than 4 hours between toileting) or urinating too infrequently may be convenient but not healthy for your bladder  Avoid going to the toilet “just in case” or more often than every 2 hours  It is usually not necessary to go when you feel the first urge  Try to go only when your bladder is full  Urgency and frequency of urination can be improved by retraining the bladder and spacing your fluid intake throughout the day  Practice good toilet habits  Don’t let your bladder control your life  TIPS TO MAINTAIN GOOD BLADDER HABITS  Maintain a good fluid intake  Depending on your body size and environment, drink 6 -8 cups (8 oz each) of fluid per day unless otherwise advised by your doctor  Not enough fluid creates a foul odor and dark color of the urine  Limit the amount of caffeine (coffee, cola, chocolate or tea) and citrus foods that you consume as these foods can be associated with increased sensation of urinary urgency and frequency  "    Limit the amount of alcohol you drink  Alcohol increases urine production and makes it difficult for the brain to coordinate bladder control  Avoid constipation by maintaining a balanced diet of dietary fiber  Cigarette smoking is also irritating to the bladder surface and is associated with bladder cancer  In addition, the coughing associated with smoking may lead to increased incontinent episodes because of the increased pressure  HOW DIET CAN AFFECT YOUR BLADDER  Although there is no particular \"diet\" that can cure bladder control, there are certain dietary suggestions you can use to help control the problem  There are 2 points to consider when evaluating how your habits and diet may affect your bladder:    Foods and fluids can irritate the bladder  Some foods and beverages are thought to contribute to bladder leakage and irritability  However their effect on the bladder is not completely understood and you may want to see if eliminating one or all of these items improves your bladder control  If you are unable to give them up completely, it is recommended that you use the following items in moderation:  Acidic beverages and foods (orange juice, grapefruit juice, lemonade etc)  Alcoholic beverages  Vinegar  Coffee (regular and decaf)  Tea (regular and decaf)  Caffeinated beverages  Carbonated beverages          Drinking enough and the right kinds of fluids  Many people with bladder control issues decrease their intake of liquids in hope that they will need to urinate less frequently or have less urinary leakage  You should not restrict fluids to control your bladder  While a decrease in liquid intake does result in a decrease in the volume of urine, the smaller amount of urine may be more highly concentrated  Highly concentrated, dark yellow urine is irritating to the bladder surface and may actually cause you to go to the bathroom more frequently        It also encourages the growth " of bacteria, which may lead to infections resulting in incontinence  Substitutions for Bladder Irritants: water is always the best beverage choice  Grape and apple juice are thirst quenchers are good selections and are not as irritating to the bladder    Low acid fruits:  Pears, apricots, papaya, watermelon  For coffee drinkers: KAVA®, Postum®, Dipti®, Kaffree Wilma®  For tea drinkers:  non-citrus or herbal and sun brewed tea

## 2023-06-15 ENCOUNTER — HOSPITAL ENCOUNTER (OUTPATIENT)
Dept: RADIOLOGY | Facility: HOSPITAL | Age: 38
Discharge: HOME | End: 2023-06-15
Payer: COMMERCIAL

## 2023-06-15 DIAGNOSIS — N50.819 PAIN IN TESTICLE, UNSPECIFIED LATERALITY: ICD-10-CM

## 2023-06-15 PROCEDURE — 76870 US EXAM SCROTUM: CPT

## 2023-11-24 ENCOUNTER — OFFICE VISIT (OUTPATIENT)
Dept: INTERNAL MEDICINE CLINIC | Facility: CLINIC | Age: 38
End: 2023-11-24
Payer: COMMERCIAL

## 2023-11-24 VITALS
OXYGEN SATURATION: 97 % | DIASTOLIC BLOOD PRESSURE: 72 MMHG | BODY MASS INDEX: 30.55 KG/M2 | RESPIRATION RATE: 16 BRPM | SYSTOLIC BLOOD PRESSURE: 116 MMHG | HEIGHT: 70 IN | WEIGHT: 213.4 LBS | HEART RATE: 68 BPM | TEMPERATURE: 96.9 F

## 2023-11-24 DIAGNOSIS — L40.9 PSORIASIS (A TYPE OF SKIN INFLAMMATION): ICD-10-CM

## 2023-11-24 DIAGNOSIS — H81.13 BENIGN PAROXYSMAL POSITIONAL VERTIGO DUE TO BILATERAL VESTIBULAR DISORDER: ICD-10-CM

## 2023-11-24 DIAGNOSIS — Z00.00 ANNUAL PHYSICAL EXAM: Primary | ICD-10-CM

## 2023-11-24 PROBLEM — G47.33 OSA (OBSTRUCTIVE SLEEP APNEA): Status: RESOLVED | Noted: 2022-10-03 | Resolved: 2023-11-24

## 2023-11-24 PROCEDURE — 99395 PREV VISIT EST AGE 18-39: CPT | Performed by: INTERNAL MEDICINE

## 2023-11-24 PROCEDURE — 99213 OFFICE O/P EST LOW 20 MIN: CPT | Performed by: INTERNAL MEDICINE

## 2023-11-24 RX ORDER — MOMETASONE FUROATE 1 MG/ML
SOLUTION TOPICAL DAILY
Qty: 30 ML | Refills: 1 | Status: SHIPPED | OUTPATIENT
Start: 2023-11-24

## 2023-11-24 RX ORDER — KETOCONAZOLE 20 MG/ML
1 SHAMPOO TOPICAL 2 TIMES WEEKLY
Qty: 1 ML | Refills: 3 | Status: SHIPPED | OUTPATIENT
Start: 2023-11-27

## 2023-11-24 NOTE — PROGRESS NOTES
[address] [facility] INTERNAL MEDICINE [place]    NAME: Kan Burgess  AGE: 45 y.o. SEX: male  : 1985     DATE: 2023     Assessment and Plan:     Problem List Items Addressed This Visit    None  Visit Diagnoses       Annual physical exam    -  Primary    Psoriasis (a type of skin inflammation)        Relevant Medications    ketoconazole (NIZORAL) 2 % shampoo (Start on 2023)    mometasone (ELOCON) 0.1 % lotion    Other Relevant Orders    Ambulatory Referral to Dermatology            Immunizations and preventive care screenings were discussed with patient today. Appropriate education was printed on patient's after visit summary. Counseling:  Alcohol/drug use: discussed moderation in alcohol intake, the recommendations for healthy alcohol use, and avoidance of illicit drug use. Dental Health: discussed importance of regular tooth brushing, flossing, and dental visits. Injury prevention: discussed safety/seat belts, safety helmets, smoke detectors, carbon dioxide detectors, and smoking near bedding or upholstery. Sexual health: discussed sexually transmitted diseases, partner selection, use of condoms, avoidance of unintended pregnancy, and contraceptive alternatives. Exercise: the importance of regular exercise/physical activity was discussed. Recommend exercise 3-5 times per week for at least 30 minutes. BMI Counseling: Body mass index is 30.62 kg/m². The BMI is above normal. Nutrition recommendations include decreasing portion sizes and encouraging healthy choices of fruits and vegetables. Exercise recommendations include moderate physical activity 150 minutes/week. Rationale for BMI follow-up plan is due to patient being overweight or obese. Depression Screening and Follow-up Plan: Patient was screened for depression during today's encounter. They screened negative with a PHQ-2 score of 2. Clincally patient does not have depression. No treatment is required. Return in about 1 year (around 11/24/2024) for Annual physical.     Chief Complaint:     Chief Complaint   Patient presents with    Physical Exam      History of Present Illness:     Adult Annual Physical   Patient here for a comprehensive physical exam. The patient reports no problems. Diet and Physical Activity  Diet/Nutrition: well balanced diet. Exercise: no formal exercise. Depression Screening  PHQ-2/9 Depression Screening    Little interest or pleasure in doing things: 1 - several days  Feeling down, depressed, or hopeless: 1 - several days  PHQ-2 Score: 2  PHQ-2 Interpretation: Negative depression screen       General Health  Sleep: sleeps well. Hearing: normal - bilateral.  Vision: no vision problems. Dental: no dental visits for >1 year.  Health  History of STDs?: no.    Advanced Care Planning  Do you have an advanced directive? no  Do you have a durable medical power of ? no     Review of Systems:     Review of Systems   Constitutional:  Negative for chills and fever. HENT:  Negative for ear pain and sore throat. Eyes:  Negative for pain and visual disturbance. Respiratory:  Negative for cough and shortness of breath. Cardiovascular:  Negative for chest pain and palpitations. Gastrointestinal:  Negative for abdominal pain and vomiting. Genitourinary:  Negative for dysuria and hematuria. Musculoskeletal:  Negative for arthralgias and back pain. Skin:  Positive for color change. Negative for rash. Chin irritation; pain, extremely itchy, ? Burning, flakes, diet helped like organic, blood type diet, avoiding dry skin,   Saw derm years ago,   Scalp got better with diet, beard worse,    Neurological:  Negative for seizures and syncope. All other systems reviewed and are negative.      Past Medical History:     Past Medical History:   Diagnosis Date    Mild asthma       Past Surgical History:     Past Surgical History:   Procedure Laterality Date    KNEE SURGERY Left       Social History:     Social History     Socioeconomic History    Marital status: /Civil Union     Spouse name: None    Number of children: None    Years of education: None    Highest education level: None   Occupational History    None   Tobacco Use    Smoking status: Former     Packs/day: 0.25     Years: 2.00     Total pack years: 0.50     Types: Cigarettes    Smokeless tobacco: Never    Tobacco comments:     Smoked on and off in early twenties. Smoked again recently but have not in several months   Vaping Use    Vaping Use: Never used   Substance and Sexual Activity    Alcohol use: Not Currently    Drug use: Not Currently    Sexual activity: None   Other Topics Concern    None   Social History Narrative    None     Social Determinants of Health     Financial Resource Strain: Not on file   Food Insecurity: Not on file   Transportation Needs: Not on file   Physical Activity: Not on file   Stress: Not on file   Social Connections: Not on file   Intimate Partner Violence: Not on file   Housing Stability: Not on file      Family History:     Family History   Problem Relation Age of Onset    Breast cancer Maternal Grandmother     Cancer Paternal Grandfather       Current Medications:     Current Outpatient Medications   Medication Sig Dispense Refill    [START ON 11/27/2023] ketoconazole (NIZORAL) 2 % shampoo Apply 1 Application topically 2 (two) times a week 1 mL 3    mometasone (ELOCON) 0.1 % lotion Apply topically daily 30 mL 1     No current facility-administered medications for this visit. Allergies:     No Known Allergies   Physical Exam:     /72 (BP Location: Left arm, Patient Position: Sitting, Cuff Size: Adult)   Pulse 68   Temp (!) 96.9 °F (36.1 °C) (Tympanic)   Resp 16   Ht 5' 10" (1.778 m)   Wt 96.8 kg (213 lb 6.4 oz)   SpO2 97%   BMI 30.62 kg/m²     Physical Exam  Vitals and nursing note reviewed.    Constitutional: General: He is not in acute distress. Appearance: Normal appearance. He is well-developed. HENT:      Head: Normocephalic and atraumatic. Eyes:      Conjunctiva/sclera: Conjunctivae normal.   Cardiovascular:      Rate and Rhythm: Normal rate and regular rhythm. Heart sounds: No murmur heard. Pulmonary:      Effort: Pulmonary effort is normal. No respiratory distress. Breath sounds: Normal breath sounds. Abdominal:      Palpations: Abdomen is soft. Tenderness: There is no abdominal tenderness. Musculoskeletal:         General: No swelling. Normal range of motion. Cervical back: Normal range of motion and neck supple. Right lower leg: No edema. Left lower leg: No edema. Lymphadenopathy:      Cervical: No cervical adenopathy. Skin:     General: Skin is warm and dry. Capillary Refill: Capillary refill takes less than 2 seconds. Neurological:      General: No focal deficit present. Mental Status: He is alert and oriented to person, place, and time. Mental status is at baseline.    Psychiatric:         Mood and Affect: Mood normal.          Lilly Marshall MD   [facility] INTERNAL MEDICINE [place]

## 2023-11-24 NOTE — PROGRESS NOTES
Assessment/Plan:     Patient is here for his annual physical exam.  We reviewed preventative screenings. Reviewed his concerns. Since losing weight his sleep apnea has resolved. Is not using CPAP anymore. Says that his vertigo is controlled. Does report history of psoriasis, rash to his chin where his beard and his scalp. Ordered ketoconazole and steroid lotion and he will let us know if it is helpful. Dermatology also ordered. Discussed healthy diet and exercise. Quality Measures:       Depression Screening and Follow-up Plan: Patient was screened for depression during today's encounter. They screened negative with a PHQ-2 score of 2. Return in about 1 year (around 11/24/2024) for Annual physical.    No problem-specific Assessment & Plan notes found for this encounter. Diagnoses and all orders for this visit:    Annual physical exam    Psoriasis (a type of skin inflammation)  -     ketoconazole (NIZORAL) 2 % shampoo; Apply 1 Application topically 2 (two) times a week  -     mometasone (ELOCON) 0.1 % lotion; Apply topically daily  -     Ambulatory Referral to Dermatology; Future    Benign paroxysmal positional vertigo due to bilateral vestibular disorder          Subjective:      Patient ID: Jose D Mehta is a 45 y.o. male.     Here for annual physical exam.        ALLERGIES:  No Known Allergies    CURRENT MEDICATIONS:    Current Outpatient Medications:     [START ON 11/27/2023] ketoconazole (NIZORAL) 2 % shampoo, Apply 1 Application topically 2 (two) times a week, Disp: 1 mL, Rfl: 3    mometasone (ELOCON) 0.1 % lotion, Apply topically daily, Disp: 30 mL, Rfl: 1    ACTIVE PROBLEM LIST:  Patient Active Problem List   Diagnosis    Mild intermittent asthma    Benign paroxysmal positional vertigo due to bilateral vestibular disorder       PAST MEDICAL HISTORY:  Past Medical History:   Diagnosis Date    Mild asthma        PAST SURGICAL HISTORY:  Past Surgical History:   Procedure Laterality Date    KNEE SURGERY Left        FAMILY HISTORY:  Family History   Problem Relation Age of Onset    Breast cancer Maternal Grandmother     Cancer Paternal Grandfather        SOCIAL HISTORY:  Social History     Socioeconomic History    Marital status: /Civil Union     Spouse name: Not on file    Number of children: Not on file    Years of education: Not on file    Highest education level: Not on file   Occupational History    Not on file   Tobacco Use    Smoking status: Former     Packs/day: 0.25     Years: 2.00     Total pack years: 0.50     Types: Cigarettes    Smokeless tobacco: Never    Tobacco comments:     Smoked on and off in early twenties. Smoked again recently but have not in several months   Vaping Use    Vaping Use: Never used   Substance and Sexual Activity    Alcohol use: Not Currently    Drug use: Not Currently    Sexual activity: Not on file   Other Topics Concern    Not on file   Social History Narrative    Not on file     Social Determinants of Health     Financial Resource Strain: Not on file   Food Insecurity: Not on file   Transportation Needs: Not on file   Physical Activity: Not on file   Stress: Not on file   Social Connections: Not on file   Intimate Partner Violence: Not on file   Housing Stability: Not on file       Review of Systems   Constitutional:  Negative for chills and fever. HENT:  Negative for ear pain and sore throat. Eyes:  Negative for pain and visual disturbance. Respiratory:  Negative for cough and shortness of breath. Cardiovascular:  Negative for chest pain and palpitations. Gastrointestinal:  Negative for abdominal pain and vomiting. Genitourinary:  Negative for dysuria and hematuria. Musculoskeletal:  Negative for arthralgias and back pain. Skin:  Positive for color change and rash. Neurological:  Negative for seizures and syncope. All other systems reviewed and are negative.         Objective:  Vitals:    11/24/23 1607   BP: 116/72   BP Location: Left arm   Patient Position: Sitting   Cuff Size: Adult   Pulse: 68   Resp: 16   Temp: (!) 96.9 °F (36.1 °C)   TempSrc: Tympanic   SpO2: 97%   Weight: 96.8 kg (213 lb 6.4 oz)   Height: 5' 10" (1.778 m)     Body mass index is 30.62 kg/m². Physical Exam  Vitals and nursing note reviewed. Constitutional:       Appearance: Normal appearance. HENT:      Head: Normocephalic and atraumatic. Cardiovascular:      Rate and Rhythm: Normal rate and regular rhythm. Heart sounds: Normal heart sounds. Pulmonary:      Effort: Pulmonary effort is normal.      Breath sounds: Normal breath sounds. Abdominal:      General: Abdomen is flat. Bowel sounds are normal.      Palpations: Abdomen is soft. Musculoskeletal:         General: Normal range of motion. Cervical back: Normal range of motion. Lymphadenopathy:      Cervical: No cervical adenopathy. Skin:     General: Skin is warm and dry. Neurological:      General: No focal deficit present. Mental Status: He is alert and oriented to person, place, and time. Mental status is at baseline. Psychiatric:         Mood and Affect: Mood normal.           RESULTS:  Hemoglobin A1C   Date/Time Value Ref Range Status   10/06/2022 10:37 AM 5.2 Normal 3.8-5.6%; PreDiabetic 5.7-6.4%;  Diabetic >=6.5%; Glycemic control for adults with diabetes <7.0% % Final     Cholesterol   Date/Time Value Ref Range Status   10/06/2022 10:37  See Comment mg/dL Final     Comment:     Cholesterol:         Pediatric <18 Years        Desirable          <170 mg/dL      Borderline High    170-199 mg/dL      High               >=200 mg/dL        Adult >=18 Years            Desirable        <200 mg/dL      Borderline High  200-239 mg/dL      High             >239 mg/dL       Triglycerides   Date/Time Value Ref Range Status   10/06/2022 10:37 AM 48 See Comment mg/dL Final     Comment:     Triglyceride:     0-9Y            <75mg/dL     10Y-17Y         <90 mg/dL >=18Y     Normal          <150 mg/dL     Borderline High 150-199 mg/dL     High            200-499 mg/dL        Very High       >499 mg/dL    Specimen collection should occur prior to N-Acetylcysteine or Metamizole administration due to the potential for falsely depressed results. HDL, Direct   Date/Time Value Ref Range Status   10/06/2022 10:37 AM 43 >=40 mg/dL Final     Comment:     Specimen collection should occur prior to Metamizole administration due to the potential for falsley depressed results. LDL Calculated   Date/Time Value Ref Range Status   10/06/2022 10:37 AM 51 0 - 100 mg/dL Final     Comment:     LDL Cholesterol:     Optimal           <100 mg/dl     Near Optimal      100-129 mg/dl     Above Optimal       Borderline High 130-159 mg/dl       High            160-189 mg/dl       Very High       >189 mg/dl         This screening LDL is a calculated result. It does not have the accuracy of the Direct Measured LDL in the monitoring of patients with hyperlipidemia and/or statin therapy. Direct Measure LDL (ZQX365) must be ordered separately in these patients. Hemoglobin   Date/Time Value Ref Range Status   10/06/2022 10:37 AM 14.6 12.0 - 17.0 g/dL Final     Hematocrit   Date/Time Value Ref Range Status   10/06/2022 10:37 AM 43.8 36.5 - 49.3 % Final     Platelets   Date/Time Value Ref Range Status   10/06/2022 10:37  149 - 390 Thousands/uL Final     TSH 3RD GENERATON   Date/Time Value Ref Range Status   10/06/2022 10:37 AM 1.378 0.450 - 4.500 uIU/mL Final     Comment:     Adult TSH (3rd generation) reference range follows the recommended guidelines of the American Thyroid Association, January, 2020.      Sodium   Date/Time Value Ref Range Status   10/06/2022 10:37  135 - 147 mmol/L Final     BUN   Date/Time Value Ref Range Status   10/06/2022 10:37 AM 12 5 - 25 mg/dL Final     Creatinine   Date/Time Value Ref Range Status   10/06/2022 10:37 AM 1.08 0.60 - 1.30 mg/dL Final Comment:     Standardized to IDMS reference method      In chart    This note was created with voice recognition software. Phonic, grammatical and spelling errors may be present within the note as a result.

## 2024-10-24 ENCOUNTER — OFFICE VISIT (OUTPATIENT)
Age: 39
End: 2024-10-24
Payer: COMMERCIAL

## 2024-10-24 VITALS
SYSTOLIC BLOOD PRESSURE: 110 MMHG | WEIGHT: 221.4 LBS | OXYGEN SATURATION: 97 % | DIASTOLIC BLOOD PRESSURE: 76 MMHG | TEMPERATURE: 95.9 F | HEART RATE: 68 BPM | BODY MASS INDEX: 31.77 KG/M2 | RESPIRATION RATE: 18 BRPM

## 2024-10-24 DIAGNOSIS — M25.561 ACUTE PAIN OF RIGHT KNEE: Primary | ICD-10-CM

## 2024-10-24 PROCEDURE — 99213 OFFICE O/P EST LOW 20 MIN: CPT | Performed by: PHYSICIAN ASSISTANT

## 2024-10-24 NOTE — PROGRESS NOTES
[facility]        NAME: Kan Burgess is a 39 y.o. male  : 1985    MRN: [id number]  DATE: 2024  TIME: 7:20 PM    Assessment and Plan   Acute pain of right knee [M25.561]  1. Acute pain of right knee  Brace          Suspect likely posterior lateral meniscal tear based on the patient's mechanism location of pain positive Thessaly test.  Hinged knee brace was given today.  Recommend that he consider follow-up evaluation with orthopedics if any worsening he would like to wait and see how things go at this time.    The patient and/or parent/legal guardian verbalized understanding of exam findings and   Treatment plan. We engaged in the shared decision-making process and treatment options were   discussed at length with the patient.  All questions, concerns and complaints were answered and   addressed to the patient's' and/or parent/legal guardians's satisfaction.    Patient Instructions   There are no Patient Instructions on file for this visit.    Follow up with PCP in 3-5 days.  Proceed to  ER if symptoms worsen.    If tests are performed, our office will contact you with results only if   changes need to made to the care plan discussed with you at the visit.   You can review your full results on [facility].     Chief Complaint     Chief Complaint   Patient presents with    Knee Pain     Pain R knee. Claims was attempting to boost himself to another level and knee twisted on 10/23         History of Present Illness       HPI  Patient comes in complaining of pain in the right knee happened yesterday. This happened at work but has not yet filed a WC claim. 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 Few clicks, no locking. Knee doesn't feel unstable. Pain is worse with hip flexion feels pain in the back. No prior injuries or surgeries.     Review of Systems   Review of  Systems  All other related systems reviewed and are negative except as noted in HPI    Current Medications       Current Outpatient Medications:     ketoconazole (NIZORAL) 2 % shampoo, Apply 1 Application topically 2 (two) times a week (Patient not taking: Reported on 10/24/2024), Disp: 1 mL, Rfl: 3    mometasone (ELOCON) 0.1 % lotion, Apply topically daily (Patient not taking: Reported on 10/24/2024), Disp: 30 mL, Rfl: 1    Current Allergies     Allergies as of 10/24/2024    (No Known Allergies)            The following portions of the patient's history were reviewed and updated as appropriate: allergies, current medications, past family history, past medical history, past social history, past surgical history and problem list.     Past Medical History:   Diagnosis Date    Mild asthma        Past Surgical History:   Procedure Laterality Date    KNEE SURGERY Left        Family History   Problem Relation Age of Onset    Breast cancer Maternal Grandmother     Cancer Paternal Grandfather          Medications have been verified.        Objective   /76 (BP Location: Left arm, Patient Position: Sitting, Cuff Size: Adult)   Pulse 68   Temp (!) 95.9 °F (35.5 °C) (Tympanic)   Resp 18   Wt 100 kg (221 lb 6.4 oz)   SpO2 97%   BMI 31.77 kg/m²   No LMP for male patient.       Physical Exam     Physical Exam  Musculoskeletal:      Right knee: No effusion.      Instability Tests: Lateral Vivi test positive. Medial Vivi test negative.         Right Knee Exam     Muscle Strength   The patient has normal right knee strength.    Range of Motion   The patient has normal right knee ROM.  Extension:  normal   Flexion:  normal     Tests   Vivi:  Medial - negative Lateral - positive  Varus: negative Valgus: negative  Lachman:  Anterior - negative      Drawer:      Posterior - negative  Patellar apprehension: negative    Other   Erythema: absent  Scars: absent  Sensation: normal  Right knee pulse absent: skin warm  "and well perfused.  Swelling: none  Effusion: no effusion present    Comments:  Positive Thessaly's test                Procedures  No Procedures performed today        Note: Portions of this record may have been created with voice recognition software. Occasional wrong word or \"sound a like\" substitutions may have occurred due to the inherent limitations of voice recognition software. Please read the chart carefully and recognize, using context, where substitutions have occurred.*      "

## 2024-12-19 ENCOUNTER — OFFICE VISIT (OUTPATIENT)
Dept: INTERNAL MEDICINE CLINIC | Facility: CLINIC | Age: 39
End: 2024-12-19
Payer: COMMERCIAL

## 2024-12-19 VITALS
WEIGHT: 232.2 LBS | HEART RATE: 70 BPM | SYSTOLIC BLOOD PRESSURE: 112 MMHG | DIASTOLIC BLOOD PRESSURE: 70 MMHG | HEIGHT: 70 IN | RESPIRATION RATE: 18 BRPM | OXYGEN SATURATION: 97 % | BODY MASS INDEX: 33.24 KG/M2

## 2024-12-19 DIAGNOSIS — Z13.1 DIABETES MELLITUS SCREENING: ICD-10-CM

## 2024-12-19 DIAGNOSIS — M25.561 ACUTE PAIN OF RIGHT KNEE: ICD-10-CM

## 2024-12-19 DIAGNOSIS — Z13.6 ENCOUNTER FOR LIPID SCREENING FOR CARDIOVASCULAR DISEASE: ICD-10-CM

## 2024-12-19 DIAGNOSIS — Z00.00 ANNUAL PHYSICAL EXAM: Primary | ICD-10-CM

## 2024-12-19 DIAGNOSIS — Z13.220 ENCOUNTER FOR LIPID SCREENING FOR CARDIOVASCULAR DISEASE: ICD-10-CM

## 2024-12-19 DIAGNOSIS — F43.23 ADJUSTMENT DISORDER WITH MIXED ANXIETY AND DEPRESSED MOOD: ICD-10-CM

## 2024-12-19 PROCEDURE — 99214 OFFICE O/P EST MOD 30 MIN: CPT | Performed by: INTERNAL MEDICINE

## 2024-12-19 PROCEDURE — 99395 PREV VISIT EST AGE 18-39: CPT | Performed by: INTERNAL MEDICINE

## 2024-12-19 NOTE — PATIENT INSTRUCTIONS
"Patient Education     Routine physical for adults   The Basics   Written by the doctors and editors at [facility]   What is a physical? -- A physical is a routine visit, or \"check-up,\" with your doctor. You might also hear it called a \"wellness visit\" or \"preventive visit.\"  During each visit, the doctor will:   Ask about your physical and mental health   Ask about your habits, behaviors, and lifestyle   Do an exam   Give you vaccines if needed   Talk to you about any medicines you take   Give advice about your health   Answer your questions  Getting regular check-ups is an important part of taking care of your health. It can help your doctor find and treat any problems you have. But it's also important for preventing health problems.  A routine physical is different from a \"sick visit.\" A sick visit is when you see a doctor because of a health concern or problem. Since physicals are scheduled ahead of time, you can think about what you want to ask the doctor.  How often should I get a physical? -- It depends on your age and health. In general, for people age 21 years and older:   If you are younger than 50 years, you might be able to get a physical every 3 years.   If you are 50 years or older, your doctor might recommend a physical every year.  If you have an ongoing health condition, like diabetes or high blood pressure, your doctor will probably want to see you more often.  What happens during a physical? -- In general, each visit will include:   Physical exam - The doctor or nurse will check your height, weight, heart rate, and blood pressure. They will also look at your eyes and ears. They will ask about how you are feeling and whether you have any symptoms that bother you.   Medicines - It's a good idea to bring a list of all the medicines you take to each doctor visit. Your doctor will talk to you about your medicines and answer any questions. Tell them if you are having any side effects that bother you. You " "should also tell them if you are having trouble paying for any of your medicines.   Habits and behaviors - This includes:   Your diet   Your exercise habits   Whether you smoke, drink alcohol, or use drugs   Whether you are sexually active   Whether you feel safe at home  Your doctor will talk to you about things you can do to improve your health and lower your risk of health problems. They will also offer help and support. For example, if you want to quit smoking, they can give you advice and might prescribe medicines. If you want to improve your diet or get more physical activity, they can help you with this, too.   Lab tests, if needed - The tests you get will depend on your age and situation. For example, your doctor might want to check your:   Cholesterol   Blood sugar   Iron level   Vaccines - The recommended vaccines will depend on your age, health, and what vaccines you already had. Vaccines are very important because they can prevent certain serious or deadly infections.   Discussion of screening - \"Screening\" means checking for diseases or other health problems before they cause symptoms. Your doctor can recommend screening based on your age, risk, and preferences. This might include tests to check for:   Cancer, such as breast, prostate, cervical, ovarian, colorectal, prostate, lung, or skin cancer   Sexually transmitted infections, such as chlamydia and gonorrhea   Mental health conditions like depression and anxiety  Your doctor will talk to you about the different types of screening tests. They can help you decide which screenings to have. They can also explain what the results might mean.   Answering questions - The physical is a good time to ask the doctor or nurse questions about your health. If needed, they can refer you to other doctors or specialists, too.  Adults older than 65 years often need other care, too. As you get older, your doctor will talk to you about:   How to prevent falling at " home   Hearing or vision tests   Memory testing   How to take your medicines safely   Making sure that you have the help and support you need at home  All topics are updated as new evidence becomes available and our peer review process is complete.  This topic retrieved from [employer] on: May 02, 2024.  Topic 305205 Version 1.0  Release: 32.4.3 - C32.122  © 2024 UpToDate, Inc. and/or its affiliates. All rights reserved.  Consumer Information Use and Disclaimer   Disclaimer: This generalized information is a limited summary of diagnosis, treatment, and/or medication information. It is not meant to be comprehensive and should be used as a tool to help the user understand and/or assess potential diagnostic and treatment options. It does NOT include all information about conditions, treatments, medications, side effects, or risks that may apply to a specific patient. It is not intended to be medical advice or a substitute for the medical advice, diagnosis, or treatment of a health care provider based on the health care provider's examination and assessment of a patient's specific and unique circumstances. Patients must speak with a health care provider for complete information about their health, medical questions, and treatment options, including any risks or benefits regarding use of medications. This information does not endorse any treatments or medications as safe, effective, or approved for treating a specific patient. UpToDate, Inc. and its affiliates disclaim any warranty or liability relating to this information or the use thereof.The use of this information is governed by the Terms of Use, available at https://www.woltersDwellableuwer.com/en/know/clinical-effectiveness-terms. 2024© UpToDate, Inc. and its affiliates and/or licensors. All rights reserved.  Copyright   © 2024 UpToDate, Inc. and/or its affiliates. All rights reserved.

## 2024-12-19 NOTE — PROGRESS NOTES
Adult Annual Physical  Name: Kan Burgess      : 1985      MRN: [id number]  Encounter Provider: Nato Moeller MD  Encounter Date: 2024   Encounter department: [facility] [place]    Assessment & Plan  Annual physical exam  Annual physical exam completed.       Encounter for lipid screening for cardiovascular disease    Orders:    Lipid Panel with Direct LDL reflex; Future    Diabetes mellitus screening    Orders:    Hemoglobin A1C; Future    Adjustment disorder with mixed anxiety and depressed mood  Reports marital problems, we discussed adding an SSRI, he will think about it, get back to me.       Acute pain of right knee  Unknown injury, he must have hit his lateral knee, he has been wearing a brace but the pain continues, reports stiffness if he is sitting down for long periods of time.  Orders:    XR knee 1 or 2 vw right; Future      Immunizations and preventive care screenings were discussed with patient today. Appropriate education was printed on patient's after visit summary.    Counseling:  Alcohol/drug use: discussed moderation in alcohol intake, the recommendations for healthy alcohol use, and avoidance of illicit drug use.  Dental Health: discussed importance of regular tooth brushing, flossing, and dental visits.  Exercise: the importance of regular exercise/physical activity was discussed. Recommend exercise 3-5 times per week for at least 30 minutes.       Depression Screening and Follow-up Plan: Patient's depression screening was positive with a PHQ-2 score of 6. Their PHQ-9 score was 15. Patient assessed for underlying major depression. Brief counseling provided and recommend additional follow-up/re-evaluation next office visit.         History of Present Illness   Adult Annual Physical:  Patient presents for annual physical.     Diet and Physical Activity:  - Diet/Nutrition: well balanced diet.  - Exercise: no formal exercise.    Depression  Screening:  - PHQ-2 Score: 6  - PHQ-9 Score: 15    General Health:  - Sleep: 4-6 hours of sleep on average.  - Hearing: tinnitus.  - Vision: no vision problems.  - Dental: regular dental visits.     Health:  - History of STDs: no.   - Urinary symptoms: none.     Advanced Care Planning:  - Has an advanced directive?: no    - Has a durable medical POA?: no    - ACP document given to patient?: no      Review of Systems   Constitutional:  Negative for chills and fever.   HENT:  Negative for ear pain and sore throat.    Eyes:  Negative for pain and visual disturbance.   Respiratory:  Negative for cough and shortness of breath.    Cardiovascular:  Negative for chest pain and palpitations.   Gastrointestinal:  Negative for abdominal pain and vomiting.   Genitourinary:  Negative for dysuria and hematuria.   Musculoskeletal:  Negative for arthralgias and back pain.   Skin:  Negative for color change and rash.   Neurological:  Negative for seizures and syncope.   Psychiatric/Behavioral:  Positive for dysphoric mood and sleep disturbance.    All other systems reviewed and are negative.    Past Medical History   Past Medical History:   Diagnosis Date    Anxiety     Depression     Eating disorder     Mild asthma      Past Surgical History:   Procedure Laterality Date    KNEE SURGERY Left      Family History   Problem Relation Age of Onset    Breast cancer Maternal Grandmother     Cancer Paternal Grandfather       reports that he has quit smoking. His smoking use included cigarettes. He has a 0.5 pack-year smoking history. He has never used smokeless tobacco. He reports that he does not currently use alcohol. He reports that he does not currently use drugs.  Current Outpatient Medications on File Prior to Visit   Medication Sig Dispense Refill    [DISCONTINUED] ketoconazole (NIZORAL) 2 % shampoo Apply 1 Application topically 2 (two) times a week (Patient not taking: Reported on 10/24/2024) 1 mL 3    [DISCONTINUED] mometasone  "(ELOCON) 0.1 % lotion Apply topically daily (Patient not taking: Reported on 10/24/2024) 30 mL 1     No current facility-administered medications on file prior to visit.   No Known Allergies     Objective   /70 (BP Location: Left arm, Patient Position: Sitting, Cuff Size: Adult)   Pulse 70   Resp 18   Ht 5' 10\" (1.778 m)   Wt 105 kg (232 lb 3.2 oz)   SpO2 97%   BMI 33.32 kg/m²     Physical Exam  Vitals and nursing note reviewed.   Constitutional:       General: He is not in acute distress.     Appearance: Normal appearance. He is well-developed.   HENT:      Head: Normocephalic and atraumatic.   Eyes:      Conjunctiva/sclera: Conjunctivae normal.   Cardiovascular:      Rate and Rhythm: Normal rate and regular rhythm.      Heart sounds: Normal heart sounds. No murmur heard.  Pulmonary:      Effort: Pulmonary effort is normal. No respiratory distress.      Breath sounds: Normal breath sounds.   Abdominal:      General: Abdomen is flat. Bowel sounds are normal.      Palpations: Abdomen is soft.      Tenderness: There is no abdominal tenderness.   Musculoskeletal:         General: No swelling. Normal range of motion.      Cervical back: Neck supple.      Right lower leg: No edema.      Left lower leg: No edema.   Skin:     General: Skin is warm and dry.      Capillary Refill: Capillary refill takes less than 2 seconds.   Neurological:      General: No focal deficit present.      Mental Status: He is alert and oriented to person, place, and time. Mental status is at baseline.   Psychiatric:         Mood and Affect: Mood normal.       "

## 2024-12-19 NOTE — ASSESSMENT & PLAN NOTE
Unknown injury, he must have hit his lateral knee, he has been wearing a brace but the pain continues, reports stiffness if he is sitting down for long periods of time.  Orders:    XR knee 1 or 2 vw right; Future

## 2024-12-31 ENCOUNTER — OFFICE VISIT (OUTPATIENT)
Age: 39
End: 2024-12-31
Payer: COMMERCIAL

## 2024-12-31 ENCOUNTER — HOSPITAL ENCOUNTER (OUTPATIENT)
Dept: RADIOLOGY | Facility: HOSPITAL | Age: 39
Discharge: HOME | End: 2024-12-31
Payer: COMMERCIAL

## 2024-12-31 VITALS
HEART RATE: 63 BPM | WEIGHT: 230.6 LBS | RESPIRATION RATE: 18 BRPM | SYSTOLIC BLOOD PRESSURE: 124 MMHG | DIASTOLIC BLOOD PRESSURE: 67 MMHG | TEMPERATURE: 96.1 F | OXYGEN SATURATION: 98 % | BODY MASS INDEX: 33.09 KG/M2

## 2024-12-31 DIAGNOSIS — M54.2 NECK PAIN: Primary | ICD-10-CM

## 2024-12-31 DIAGNOSIS — M25.561 ACUTE PAIN OF RIGHT KNEE: ICD-10-CM

## 2024-12-31 PROCEDURE — 99213 OFFICE O/P EST LOW 20 MIN: CPT | Performed by: PHYSICIAN ASSISTANT

## 2024-12-31 PROCEDURE — 73564 X-RAY EXAM KNEE 4 OR MORE: CPT

## 2024-12-31 RX ORDER — METHOCARBAMOL 750 MG/1
750 TABLET, FILM COATED ORAL EVERY 6 HOURS PRN
Qty: 30 TABLET | Refills: 0 | Status: SHIPPED | OUTPATIENT
Start: 2024-12-31

## 2024-12-31 RX ORDER — NAPROXEN 500 MG/1
500 TABLET ORAL 2 TIMES DAILY WITH MEALS
Qty: 14 TABLET | Refills: 0 | Status: SHIPPED | OUTPATIENT
Start: 2024-12-31 | End: 2025-01-07

## 2024-12-31 NOTE — PROGRESS NOTES
[facility]        NAME: Kan Burgess is a 39 y.o. male  : 1985    MRN: [id number]  DATE: 2024  TIME: 4:58 PM    Assessment and Plan   Neck pain [M54.2]  1. Neck pain  methocarbamol (Robaxin-750) 750 mg tablet    naproxen (EC NAPROSYN) 500 MG EC tablet          Patient peers to have neck spasm in the right SCM will trial heating pad naproxen Robaxin as needed        The patient and/or parent/legal guardian verbalized understanding of exam findings and   Treatment plan. We engaged in the shared decision-making process and treatment options were   discussed at length with the patient.  All questions, concerns and complaints were answered and   addressed to the patient's' and/or parent/legal guardians's satisfaction.    Patient Instructions   There are no Patient Instructions on file for this visit.    Follow up with PCP in 3-5 days.  Proceed to  ER if symptoms worsen.    If tests are performed, our office will contact you with results only if   changes need to made to the care plan discussed with you at the visit.   [facility]     Chief Complaint     Chief Complaint   Patient presents with   • Neck Pain     Right neck pain started 2 nights ago.          History of Present Illness       HPI  Patient reports neck pain began 2 nights ago. Denies any injury. Reports this as stress related felt he tensed up and strained his neck. More on the right side. Has had it in the past worse with lateral bending right side rotation. No radicular symptoms.     Review of Systems   Review of Systems  All other related systems reviewed with patient or accompanying historian and are negative except as noted in HPI    Current Medications       Current Outpatient Medications:   •  methocarbamol (Robaxin-750) 750 mg tablet, Take 1 tablet (750 mg total) by mouth every 6 (six) hours as needed for muscle spasms, Disp: 30 tablet, Rfl: 0  •  naproxen (EC NAPROSYN) 500  MG EC tablet, Take 1 tablet (500 mg total) by mouth 2 (two) times a day with meals for 7 days, Disp: 14 tablet, Rfl: 0    Current Allergies     Allergies as of 12/31/2024   • (No Known Allergies)            The following portions of the patient's history were reviewed and updated as appropriate: allergies, current medications, past family history, past medical history, past social history, past surgical history and problem list.     Past Medical History:   Diagnosis Date   • Anxiety    • Depression    • Eating disorder    • Mild asthma        Past Surgical History:   Procedure Laterality Date   • KNEE SURGERY Left        Family History   Problem Relation Age of Onset   • Breast cancer Maternal Grandmother    • Cancer Paternal Grandfather          Medications have been verified.        Objective   /67   Pulse 63   Temp (!) 96.1 °F (35.6 °C)   Resp 18   Wt 105 kg (230 lb 9.6 oz)   SpO2 98%   BMI 33.09 kg/m²   No LMP for male patient.       Physical Exam     Physical Exam  Constitutional:       General: He is not in acute distress.     Appearance: He is well-developed.   HENT:      Head: Normocephalic and atraumatic.   Eyes:      General: No scleral icterus.     Conjunctiva/sclera: Conjunctivae normal.   Pulmonary:      Effort: Pulmonary effort is normal. No respiratory distress.      Breath sounds: No stridor.   Musculoskeletal:      Cervical back: Normal range of motion and neck supple.      Comments: Tenderness palpation along the right clavicular head of the SCM without any limitations in range of motion patient is neurovascularly intact in the right upper extremity   Skin:     General: Skin is warm and dry.   Neurological:      Mental Status: He is alert and oriented to person, place, and time.   Psychiatric:         Behavior: Behavior normal.         Ortho Exam      Procedures  No Procedures performed today        Note: Portions of this record may have been created with voice recognition software.  "Occasional wrong word or \"sound a like\" substitutions may have occurred due to the inherent limitations of voice recognition software. Please read the chart carefully and recognize, using context, where substitutions have occurred.*      "

## 2025-01-02 ENCOUNTER — RESULTS FOLLOW-UP (OUTPATIENT)
Dept: INTERNAL MEDICINE CLINIC | Facility: CLINIC | Age: 40
End: 2025-01-02

## 2025-01-02 DIAGNOSIS — M25.561 ACUTE PAIN OF RIGHT KNEE: Primary | ICD-10-CM

## 2025-01-28 ENCOUNTER — OFFICE VISIT (OUTPATIENT)
Dept: OBGYN CLINIC | Facility: CLINIC | Age: 40
End: 2025-01-28
Payer: COMMERCIAL

## 2025-01-28 VITALS — HEIGHT: 70 IN | WEIGHT: 232 LBS | BODY MASS INDEX: 33.21 KG/M2

## 2025-01-28 DIAGNOSIS — M23.91 INTERNAL DERANGEMENT OF RIGHT KNEE: Primary | ICD-10-CM

## 2025-01-28 PROCEDURE — 99203 OFFICE O/P NEW LOW 30 MIN: CPT | Performed by: ORTHOPAEDIC SURGERY

## 2025-01-28 NOTE — PROGRESS NOTES
"Name: Kan Burgess      : 1985       MRN: [id number]   Encounter Provider: Steve Daugherty MD   Encounter Date: 25  Encounter department: [facility] [place]         Assessment & Plan  Internal derangement of right knee  - imaging reviewed in detail with patient   - Likely PFS vs meniscus tear   - Referral to therapy sent   - MRI R knee ordered - indicated due to concern for meniscus injury given mechanism of injury and clinical exam, results will guide possible surgical management   - Follow up after MRI   Orders:    Ambulatory Referral to Orthopedic Surgery    MRI knee right wo contrast; Future    Ambulatory Referral to Physical Therapy; Future         To Do Next Visit:  Follow up after MRI for review    _____________________________________________________  CHIEF COMPLAINT:  Chief Complaint   Patient presents with    Right Knee - Pain         SUBJECTIVE:  Kan Burgess is a 39 y.o. male who presents for evaluation of R knee. States that 3 months ago he was at work when he was climbing a ladder, went to grab something, and twisted his knee. When his happened he felt immediate pain in his knee. He was able to walk afterwards, but had pain with doing so. He was seen at urgent care and given a hinged knee brace. He states that the initial \"surge\" of pain has gone down, but he still feels pain when he does certain movements such as bending his knee fully, or keeping it in one position for long periods of time. He also states that his knee pops and he has pain when he goes to kneel down.     PAST MEDICAL HISTORY:  Past Medical History:   Diagnosis Date    Anxiety     Depression     Eating disorder     Mild asthma        PAST SURGICAL HISTORY:  Past Surgical History:   Procedure Laterality Date    KNEE SURGERY Left        FAMILY HISTORY:  Family History   Problem Relation Age of Onset    Breast cancer Maternal Grandmother     Cancer Paternal Grandfather  " "      SOCIAL HISTORY:  Social History     Tobacco Use    Smoking status: Former     Current packs/day: 0.25     Average packs/day: 0.3 packs/day for 2.0 years (0.5 ttl pk-yrs)     Types: Cigarettes    Smokeless tobacco: Never    Tobacco comments:     Smoked on and off in early twenties. Smoked again recently but have not in several months   Vaping Use    Vaping status: Never Used   Substance Use Topics    Alcohol use: Not Currently    Drug use: Not Currently       MEDICATIONS:    Current Outpatient Medications:     methocarbamol (Robaxin-750) 750 mg tablet, Take 1 tablet (750 mg total) by mouth every 6 (six) hours as needed for muscle spasms, Disp: 30 tablet, Rfl: 0    naproxen (EC NAPROSYN) 500 MG EC tablet, Take 1 tablet (500 mg total) by mouth 2 (two) times a day with meals for 7 days, Disp: 14 tablet, Rfl: 0    ALLERGIES:  No Known Allergies    LABS:  HgA1c:   Lab Results   Component Value Date    HGBA1C 5.2 10/06/2022     BMP:   Lab Results   Component Value Date    CALCIUM 8.9 10/06/2022    K 4.3 10/06/2022    CO2 27 10/06/2022     10/06/2022    BUN 12 10/06/2022    CREATININE 1.08 10/06/2022     CBC: No components found for: \"CBC\"    _____________________________________________________  PHYSICAL EXAMINATION:  Vital signs: Ht 5' 10\" (1.778 m)   Wt 105 kg (232 lb)   BMI 33.29 kg/m²   General: No acute distress, awake and alert  Psychiatric: Mood and affect appear appropriate  HEENT: Trachea Midline, No torticollis, no apparent facial trauma  Cardiovascular: No audible murmurs; Extremities appear perfused  Pulmonary: No audible wheezing or stridor  Skin: No open lesions; see further details (if any) below    MUSCULOSKELETAL EXAMINATION:  Extremities:    R knee:   Skin intact, no swelling or ecchymosis noted   ROM full, has some pain with full knee flexion   TTP over base of patella , medial joint line  +ankle/toe plantarflexion/dorsiflexion  SILT SP/DP/T  Toes brisk capillary refill <1 second "       _____________________________________________________  STUDIES REVIEWED:  I personally reviewed the images obtained in office today and my independent interpretation is as follows:  Xr R knee - no evidence of fracture or abnormality     PROCEDURES PERFORMED:  No procedures performed today     Maryellen Vargas PA-C - assisting  Steve Daugherty MD

## 2025-01-28 NOTE — ASSESSMENT & PLAN NOTE
- imaging reviewed in detail with patient   - Likely PFS vs meniscus tear   - Referral to therapy sent   - MRI R knee ordered - indicated due to concern for meniscus injury given mechanism of injury and clinical exam, results will guide possible surgical management   - Follow up after MRI   Orders:    Ambulatory Referral to Orthopedic Surgery    MRI knee right wo contrast; Future    Ambulatory Referral to Physical Therapy; Future

## 2025-01-29 ENCOUNTER — APPOINTMENT (OUTPATIENT)
Dept: LAB | Facility: HOSPITAL | Age: 40
End: 2025-01-29
Payer: COMMERCIAL

## 2025-01-29 ENCOUNTER — RESULTS FOLLOW-UP (OUTPATIENT)
Dept: INTERNAL MEDICINE CLINIC | Facility: CLINIC | Age: 40
End: 2025-01-29

## 2025-01-29 DIAGNOSIS — Z13.220 ENCOUNTER FOR LIPID SCREENING FOR CARDIOVASCULAR DISEASE: ICD-10-CM

## 2025-01-29 DIAGNOSIS — Z13.6 ENCOUNTER FOR LIPID SCREENING FOR CARDIOVASCULAR DISEASE: ICD-10-CM

## 2025-01-29 DIAGNOSIS — Z13.1 DIABETES MELLITUS SCREENING: ICD-10-CM

## 2025-01-29 LAB
CHOLEST SERPL-MCNC: 154 MG/DL (ref ?–200)
EST. AVERAGE GLUCOSE BLD GHB EST-MCNC: 94 MG/DL
HBA1C MFR BLD: 4.9 %
HDLC SERPL-MCNC: 39 MG/DL
LDLC SERPL CALC-MCNC: 96 MG/DL (ref 0–100)
TRIGL SERPL-MCNC: 96 MG/DL (ref ?–150)

## 2025-01-29 PROCEDURE — 36415 COLL VENOUS BLD VENIPUNCTURE: CPT

## 2025-01-29 PROCEDURE — 83036 HEMOGLOBIN GLYCOSYLATED A1C: CPT

## 2025-01-29 PROCEDURE — 80061 LIPID PANEL: CPT

## 2025-02-05 ENCOUNTER — EVALUATION (OUTPATIENT)
Dept: PHYSICAL THERAPY | Facility: CLINIC | Age: 40
End: 2025-02-05
Payer: COMMERCIAL

## 2025-02-05 DIAGNOSIS — M23.91 INTERNAL DERANGEMENT OF RIGHT KNEE: ICD-10-CM

## 2025-02-05 PROCEDURE — 97110 THERAPEUTIC EXERCISES: CPT | Performed by: PHYSICAL THERAPIST

## 2025-02-05 PROCEDURE — 97161 PT EVAL LOW COMPLEX 20 MIN: CPT | Performed by: PHYSICAL THERAPIST

## 2025-02-05 PROCEDURE — 97140 MANUAL THERAPY 1/> REGIONS: CPT | Performed by: PHYSICAL THERAPIST

## 2025-02-05 NOTE — PROGRESS NOTES
PT Evaluation     Today's date: 2025  Patient name: Kan Burgess  : 1985  MRN: [id number]  Referring provider: Maryellen Vargas PA-C  Dx:   Encounter Diagnosis     ICD-10-CM    1. Internal derangement of right knee  M23.91 Ambulatory Referral to Physical Therapy                     Assessment  Impairments: abnormal or restricted ROM, activity intolerance, impaired physical strength, lacks appropriate home exercise program and pain with function    Assessment details: Patient is a .38 y/o male with chief complaints of right knee pain that has been ongoing for 4 months.  Patient presents with decreased functional mobility due to increased pain, decreased strength, decreased ROM associated with possible meniscus tear.  Positive Thessaly noted.   Patient will benefit from skilled physical therapy to address impairment and improve functional mobility.  PT needed to allow for return to maximal function and improve quality of life.   Understanding of Dx/Px/POC: good     Prognosis: good    Goals  STG within 4 weeks:   1. Patient to be independent in HEP.   2. Reduce pain by 50% to improve quality of life.   3. Improve knee strength to 5/5 without pain.   LTG within 8 weeks:   1. Patient to be independent in ADLs/IADLs without difficulty.  2. Patient to be able to squat with correct technique without pain.   3. Patient to be independent with comprehensive HEP.     Plan  Patient would benefit from: skilled physical therapy and PT eval  Planned modality interventions: cryotherapy, hydrotherapy and unattended electrical stimulation    Planned therapy interventions: therapeutic training, therapeutic exercise, therapeutic activities, stretching, strengthening, postural training, patient education, neuromuscular re-education, manual therapy, joint mobilization, IADL retraining, activity modification, ADL retraining, ADL training, body mechanics training, flexibility, functional ROM exercises, gait training, graded  activity, graded exercise, graded motor and home exercise program    Frequency: 2x week  Duration in weeks: 8  Plan of Care beginning date: 2025  Plan of Care expiration date: 2025  Treatment plan discussed with: patient        Subjective Evaluation    History of Present Illness  Mechanism of injury: Patient is a 40 y/o male with chief complaints of right knee pain that began in 2024 when climbing a ladder.  Patient states his knee is popping; no clicking and no giving out noted.  His pain hasn't improved much and he was seen by ortho.  He is referred for MRI and PT.  He arrives today for evaluation and treatment of right knee.    Patient Goals  Patient goals for therapy: decreased pain  Patient goal: avoid of surgery  Pain  At best pain ratin  At worst pain ratin  Quality: dull ache  Alleviating factors: nothing relieves the pain.  Exacerbated by: twisting the knee, bending, not moving for a period of time, going from gas to break.  Symptom course: intially improved and now not changing.    Social Support    Employment status: working (drive truck)  Exercise history: none      Diagnostic Tests  X-ray: normal  Treatments  No previous or current treatments        Objective     Active Range of Motion   Left Knee   Flexion: 130 degrees   Extension: 0 degrees   Extensor la degrees     Right Knee   Flexion: 130 degrees   Extension: 0 degrees   Extensor la degrees     Strength/Myotome Testing     Left Knee   Flexion: 4+  Extension: 4+    Right Knee   Flexion: 4+  Extension: 4+    Tests     Right Knee   Positive Thessaly's test at 5 degrees and Thessaly's test at 20 degrees.   Negative anterior drawer, anterior Lachman, lateral Vivi, medial Vivi, posterior drawer, posterior Lachman, valgus stress test at 0 degrees, valgus stress test at 30 degrees, varus stress test at 0 degrees and varus stress test at 30 degrees.              Precautions: none.  "    [email or web address]  Access Code: [email or web address]    POC expires Unit limit Auth Expiration date PT/OT/ST + Visit Limit?   4/2/25 4 N/a BOMN                           Visit/Unit Tracking  AUTH Status:  Date 2/5              N/a Used 1               Remaining                     Manuals 2/5            IASTM- R lat knee 10'                                                    Neuro Re-Ed             Quad set  HEP instruction             TKE Green 3\"x20                                                                             Ther Ex             PT Edu  KS            Recum bike  5 min            Standing hip abd/ext green x10 ea                                                                              Ther Activity             Mini squat  Trial; pain                          Gait Training                                       Modalities                                            "

## 2025-02-12 ENCOUNTER — OFFICE VISIT (OUTPATIENT)
Dept: PHYSICAL THERAPY | Facility: CLINIC | Age: 40
End: 2025-02-12
Payer: COMMERCIAL

## 2025-02-12 DIAGNOSIS — M23.91 INTERNAL DERANGEMENT OF RIGHT KNEE: Primary | ICD-10-CM

## 2025-02-12 PROCEDURE — 97140 MANUAL THERAPY 1/> REGIONS: CPT | Performed by: PHYSICAL THERAPIST

## 2025-02-12 PROCEDURE — 97112 NEUROMUSCULAR REEDUCATION: CPT | Performed by: PHYSICAL THERAPIST

## 2025-02-12 PROCEDURE — 97110 THERAPEUTIC EXERCISES: CPT | Performed by: PHYSICAL THERAPIST

## 2025-02-12 NOTE — PROGRESS NOTES
"Daily Note     Today's date: 2025  Patient name: Kan Burgess  : 1985  MRN: [id number]  Referring provider: Maryellen Vargas PA-C  Dx: No diagnosis found.                Subjective: Patient states his knee is feeling \"a little better.\"  He states he has done his exercises every day this week.      Objective: See treatment diary below      Assessment: Tolerated treatment well.  Patient able to progress exercise program to include single-leg balance with rebounder, single-leg balance progression of TKE, LAQ, seated ham curl, straight leg raise, and side-lying hip abduction.  Progressed his HEP and provided him a updated written copy.  Continued with IASTM to right lateral knee with good response.  Patient would benefit from continued PT      Plan: Continue per plan of care.      Precautions: none.     [email or web address]  Access Code: [email or web address]    POC expires Unit limit Auth Expiration date PT/OT/ST + Visit Limit?   25 4 N/a BOMN                           Visit/Unit Tracking  AUTH Status:  Date              N/a Used 1 2              Remaining                     Manuals            IASTM- R lat knee 10'  10'                                                  Neuro Re-Ed             Quad set  HEP instruction             TKE Green 3\"x20 Into SLB  Green 2x10            SLB w rebounder toss  Bball 2x10           Biodex LOS  Lv 12 floor/foam x 3 ea                                                   Ther Ex             PT Edu  KS KS           Recum bike  5 min 7 min            Standing hip abd/ext green x10 ea  Home            Side step w band   NV           LAQ   2# 2x10            SHC   Green 2x10            Side lying hip abd   2# 2x10            Mini squat   2x10            TRX retrolunge   2x10            Ther Activity                                       Gait Training                                       Modalities                                            "

## 2025-02-14 ENCOUNTER — HOSPITAL ENCOUNTER (OUTPATIENT)
Dept: MRI IMAGING | Facility: CLINIC | Age: 40
Discharge: HOME | End: 2025-02-14
Payer: COMMERCIAL

## 2025-02-14 DIAGNOSIS — M23.91 INTERNAL DERANGEMENT OF RIGHT KNEE: ICD-10-CM

## 2025-02-14 PROCEDURE — 73721 MRI JNT OF LWR EXTRE W/O DYE: CPT

## 2025-02-20 ENCOUNTER — APPOINTMENT (OUTPATIENT)
Dept: PHYSICAL THERAPY | Facility: CLINIC | Age: 40
End: 2025-02-20
Payer: COMMERCIAL

## 2025-02-26 ENCOUNTER — OFFICE VISIT (OUTPATIENT)
Dept: PHYSICAL THERAPY | Facility: CLINIC | Age: 40
End: 2025-02-26
Payer: COMMERCIAL

## 2025-02-26 DIAGNOSIS — M23.91 INTERNAL DERANGEMENT OF RIGHT KNEE: Primary | ICD-10-CM

## 2025-02-26 PROCEDURE — 97112 NEUROMUSCULAR REEDUCATION: CPT | Performed by: PHYSICAL THERAPIST

## 2025-02-26 PROCEDURE — 97110 THERAPEUTIC EXERCISES: CPT | Performed by: PHYSICAL THERAPIST

## 2025-02-26 PROCEDURE — 97140 MANUAL THERAPY 1/> REGIONS: CPT | Performed by: PHYSICAL THERAPIST

## 2025-02-26 NOTE — PROGRESS NOTES
"Daily Note     Today's date: 2025  Patient name: Kan Burgess  : 1985  MRN: [id number]  Referring provider: Maryellen Vargas PA-C  Dx:   Encounter Diagnosis     ICD-10-CM    1. Internal derangement of right knee  M23.91                       Subjective: Patient states he had a busy week and didn't get to do his exercises. He reports feeling more sore at this time. He has a follow up with physician next week.       Objective: See treatment diary below      Assessment: Tolerated treatment well.  He's able to perform listed exercises with some pain with leg press and step up. Improved pain level following IASTM to lateral knee, however, then posterior knee pain present.  This is reduced following hamstring stretch.  Patient would benefit from continued PT      Plan: Continue per plan of care.      Precautions: none.     [email or web address]  Access Code: [email or web address]    POC expires Unit limit Auth Expiration date PT/OT/ST + Visit Limit?   25 4 N/a BOMN                           Visit/Unit Tracking  AUTH Status:  Date             N/a Used 1 2 3             Remaining                     Manuals           IASTM- R lat knee 10'  10' 10'           R Ant tibial glide    3'                                    Neuro Re-Ed             Quad set  HEP instruction             TKE Green 3\"x20 Into SLB  Green 2x10  Into SLB green 2x10           SLB w rebounder toss  Bball 2x10 Yel MB 2x10 ea           Biodex LOS  Lv 12 floor/foam x 3 ea  Lv 12 foam x5                                                  Ther Ex             PT Edu  KS [place]          Recum bike  5 min 7 min  10 min           Standing hip abd/ext green x10 ea  Home  Green 2x10 ea           Side step w band   NV Green 20 ft x 4           LAQ   2# 2x10            SHC   Green 2x10            Side lying hip abd   2# 2x10            Mini squat   2x10            TRX retrolunge   2x10  2x10           Hamstring stretch at " "step    4x20\"                        Ther Activity             Leg press    115# x10; 95# x10                        Gait Training                                       Modalities                                            "

## 2025-02-27 ENCOUNTER — OFFICE VISIT (OUTPATIENT)
Dept: OBGYN CLINIC | Facility: CLINIC | Age: 40
End: 2025-02-27
Payer: COMMERCIAL

## 2025-02-27 VITALS — HEIGHT: 70 IN | BODY MASS INDEX: 33.21 KG/M2 | WEIGHT: 232 LBS

## 2025-02-27 DIAGNOSIS — M23.91 INTERNAL DERANGEMENT OF RIGHT KNEE: Primary | ICD-10-CM

## 2025-02-27 DIAGNOSIS — S83.271A COMPLEX TEAR OF LATERAL MENISCUS OF RIGHT KNEE AS CURRENT INJURY, INITIAL ENCOUNTER: ICD-10-CM

## 2025-02-27 PROCEDURE — 99214 OFFICE O/P EST MOD 30 MIN: CPT | Performed by: ORTHOPAEDIC SURGERY

## 2025-02-27 NOTE — PROGRESS NOTES
Name: Kan Burgess      : 1985       MRN: [id number]   Encounter Provider: Steve Daugherty MD   Encounter Date: 25  Encounter department: [facility] [place]         Assessment & Plan  Complex tear of lateral meniscus of right knee as current injury, initial encounter  MRI of the right knee, taken on 2025, were reviewed and discussed with the patient during today's visit.   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    Recommend patient to continue working with PT at this time.   Recommend patient to use oral and topical analgesics as needed for symptomatic management.   Recommend patient to see Dr. Londono for further evaluation and work-up regarding his on-going symptoms. Referral to Dr. Singh was placed during today's visit.     Orders:    Ambulatory Referral to Orthopedic Surgery; Future        To Do Next Visit:  Recommend patient to follow with Dr. Singh in regards to further evaluation and work-up.     _____________________________________________________  CHIEF COMPLAINT:  Chief Complaint   Patient presents with    Right Knee - Follow-up         SUBJECTIVE:  Kan Burgess is a 39 y.o. male who presents for follow-up evaluation of the right knee. Patient was last seen in the office on 2025, where an MRI of the right knee was ordered. Patient states that approximately 4 months ago, while he was climbing a ladder while a work, he went to grab something and subsequently twisted his knee. The pain in his right knee began immediately following this injury. Reports that he was able to walk afterwards, but had pain with doing so. He was seen at urgent care and given a hinged knee brace. At this time, patient reports that his pain continues to improve, but he does still experience pain with certain movements, including bending and twisting, or keeping it in one position for prolonged periods of time. Denies taking any  "medication for pain at this time. Endorses actively participating in PT since last visit.     PAST MEDICAL HISTORY:  Past Medical History:   Diagnosis Date    Anxiety     Depression     Eating disorder     Mild asthma        PAST SURGICAL HISTORY:  Past Surgical History:   Procedure Laterality Date    KNEE SURGERY Left        FAMILY HISTORY:  Family History   Problem Relation Age of Onset    Breast cancer Maternal Grandmother     Cancer Paternal Grandfather        SOCIAL HISTORY:  Social History     Tobacco Use    Smoking status: Former     Current packs/day: 0.25     Average packs/day: 0.3 packs/day for 2.0 years (0.5 ttl pk-yrs)     Types: Cigarettes    Smokeless tobacco: Never    Tobacco comments:     Smoked on and off in early twenties. Smoked again recently but have not in several months   Vaping Use    Vaping status: Never Used   Substance Use Topics    Alcohol use: Not Currently    Drug use: Not Currently       MEDICATIONS:    Current Outpatient Medications:     methocarbamol (Robaxin-750) 750 mg tablet, Take 1 tablet (750 mg total) by mouth every 6 (six) hours as needed for muscle spasms (Patient not taking: Reported on 2/5/2025), Disp: 30 tablet, Rfl: 0    naproxen (EC NAPROSYN) 500 MG EC tablet, Take 1 tablet (500 mg total) by mouth 2 (two) times a day with meals for 7 days, Disp: 14 tablet, Rfl: 0    ALLERGIES:  No Known Allergies    LABS:  HgA1c:   Lab Results   Component Value Date    HGBA1C 4.9 01/29/2025     BMP:   Lab Results   Component Value Date    CALCIUM 8.9 10/06/2022    K 4.3 10/06/2022    CO2 27 10/06/2022     10/06/2022    BUN 12 10/06/2022    CREATININE 1.08 10/06/2022     CBC: No components found for: \"CBC\"    _____________________________________________________  PHYSICAL EXAMINATION:  Vital signs: Ht 5' 10\" (1.778 m)   Wt 105 kg (232 lb)   BMI 33.29 kg/m²   General: No acute distress, awake and alert  Psychiatric: Mood and affect appear appropriate  HEENT: Trachea Midline, No " torticollis, no apparent facial trauma  Cardiovascular: No audible murmurs; Extremities appear perfused  Pulmonary: No audible wheezing or stridor  Skin: No open lesions; see further details (if any) below    MUSCULOSKELETAL EXAMINATION:  Extremities:  Right knee  Skin: clean, dry, and intact. No swelling or ecchymosis noted.   ROM: full ROM at the R with. Patient reports pain with full knee flexion.   TTP over lateral joint line  Motor function intact with +ankle/toe plantarflexion/dorsiflexion  Sensation intact to light touch throughout entire right lower extremity   Brisk cap refill to toes       _____________________________________________________  STUDIES REVIEWED:  I personally reviewed the images  and my independent interpretation is as follows:      MRI of right knee, performed on 2/14/2025, demonstrates tear of discoid meniscus      PROCEDURES PERFORMED:  No procedures performed today       Scribe Attestation      I,:  Kristyn Rivas PA-C am acting as a scribe while in the presence of the attending physician.:       I,:  Steve Daugherty MD personally performed the services described in this documentation    as scribed in my presence.:

## 2025-02-28 NOTE — ASSESSMENT & PLAN NOTE
MRI of the right knee, taken on 2/14/2025, were reviewed and discussed with the patient during today's visit.   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    Recommend patient to continue working with PT at this time.   Recommend patient to use oral and topical analgesics as needed for symptomatic management.   Recommend patient to see Dr. Londono for further evaluation and work-up regarding his on-going symptoms. Referral to Dr. Singh was placed during today's visit.     Orders:    Ambulatory Referral to Orthopedic Surgery; Future

## 2025-03-04 DIAGNOSIS — F43.23 ADJUSTMENT DISORDER WITH MIXED ANXIETY AND DEPRESSED MOOD: Primary | ICD-10-CM

## 2025-03-04 RX ORDER — ESCITALOPRAM OXALATE 5 MG/1
5 TABLET ORAL DAILY
Qty: 30 TABLET | Refills: 5 | Status: SHIPPED | OUTPATIENT
Start: 2025-03-04

## 2025-03-05 ENCOUNTER — OFFICE VISIT (OUTPATIENT)
Dept: PHYSICAL THERAPY | Facility: CLINIC | Age: 40
End: 2025-03-05
Payer: COMMERCIAL

## 2025-03-05 DIAGNOSIS — M23.91 INTERNAL DERANGEMENT OF RIGHT KNEE: Primary | ICD-10-CM

## 2025-03-05 PROCEDURE — 97140 MANUAL THERAPY 1/> REGIONS: CPT | Performed by: PHYSICAL THERAPIST

## 2025-03-05 PROCEDURE — 97110 THERAPEUTIC EXERCISES: CPT | Performed by: PHYSICAL THERAPIST

## 2025-03-05 PROCEDURE — 97112 NEUROMUSCULAR REEDUCATION: CPT | Performed by: PHYSICAL THERAPIST

## 2025-03-05 NOTE — PROGRESS NOTES
"Daily Note     Today's date: 3/5/2025  Patient name: Kan Burgess  : 1985  MRN: [id number]  Referring provider: Maryellen Vargas PA-C  Dx:   Encounter Diagnosis     ICD-10-CM    1. Internal derangement of right knee  M23.91                       Subjective: Patient states he saw surgeon who is sending him for another surgical consult.  He states he was able to run last weekend without knee pain.        Objective: See treatment diary below      Assessment: Tolerated treatment well.  He's able to progress exercise program without increased pain. He performs leg press with improved tolerance this visit. He will see ortho surgeon as scheduled tomorrow.  Patient would benefit from continued PT      Plan: Continue per plan of care.      Precautions: none.     [email or web address]  Access Code: [email or web address]    POC expires Unit limit Auth Expiration date PT/OT/ST + Visit Limit?   25 4 N/a BOMN                           Visit/Unit Tracking  AUTH Status:  Date 2/5 2/12 2/26 3/5           N/a Used 1 2 3 4             Remaining                     Manuals  35          IASTM- R lat knee 10'  10' 10'  10'          R Ant tibial glide    3'                                    Neuro Re-Ed             Quad set  HEP instruction             TKE Green 3\"x20 Into SLB  Green 2x10  Into SLB green 2x10  Into SLB green 2x10          SLB w rebounder toss  Bball 2x10 Yel MB 2x10 ea           Biodex LOS  Lv 12 floor/foam x 3 ea  Lv 12 foam x5  Lv 10 foam x 4                                                 Ther Ex             PT Edu  KS KS KS KS         Recum bike  5 min 7 min  10 min  10 min          Standing hip abd/ext green x10 ea  Home  Green 2x10 ea  Home          Side step w band   NV Green 20 ft x 4  Home          LAQ   2# 2x10   SAQ 2# 2x10 ea          SHC   Green 2x10            Side lying hip abd   2# 2x10            Mini squat   2x10            TRX retrolunge   2x10  2x10  2x10        " "  Hamstring stretch at step    4x20\"           SLR    2# 2x10 ea          Prone hip ext     2# 2x10 ea          Adductor set     3\"X20          Ther Activity             Leg press    115# x10; 95# x10  95# 2x10                       Gait Training                                       Modalities                                            "

## 2025-03-06 ENCOUNTER — OFFICE VISIT (OUTPATIENT)
Dept: OBGYN CLINIC | Facility: MEDICAL CENTER | Age: 40
End: 2025-03-06
Payer: COMMERCIAL

## 2025-03-06 VITALS — BODY MASS INDEX: 33.21 KG/M2 | WEIGHT: 232 LBS | HEIGHT: 70 IN

## 2025-03-06 DIAGNOSIS — S83.271A COMPLEX TEAR OF LATERAL MENISCUS OF RIGHT KNEE AS CURRENT INJURY, INITIAL ENCOUNTER: ICD-10-CM

## 2025-03-06 PROCEDURE — 99213 OFFICE O/P EST LOW 20 MIN: CPT | Performed by: STUDENT IN AN ORGANIZED HEALTH CARE EDUCATION/TRAINING PROGRAM

## 2025-03-06 NOTE — PROGRESS NOTES
ASSESSMENT/PLAN:    Assessment & Plan  Complex tear of lateral meniscus of right knee as current injury, initial encounter  Discussed history, exam, and imaging with patient. Presentation most consistent with lateral meniscus tear in setting of discoid meniscus.  Discussed meniscus pathology and its function as a shock absorber for the knee and to improve congruity of lateral compartment. Discussed management options  Conservative treatment would entail continued activity modification as well as physical therapy to strengthen muscles about the knee as well as to the core to aid in symptomatic relief.  Bracing could also be considered to assist with stability and/or offload the associated compartment.  Oral/topical medications or Corticosteroid vs PRP injections can be used for supplemental pain relief.  The benefit of conservative treatment would be avoiding the risks of surgery and risk of anesthesia.  The downside to conservative management is potential failure to obtain relief of symptoms.   Surgical intervention was likewise discussed in the form of right knee diagnostic arthroscopy with partial meniscectomy/saucerization and possible meniscus repair.  Risks of surgery include bleeding, infection, damage to local surrounding structures, iatrogenic intra-articular injury, failure of repair, retear, unstable meniscus, continued pain, development of arthritis, inability to return to activity at the same level, DVT/PE and possible need for repeat surgery.  Risks of anesthesia were likewise discussed including cardiovascular or pulmonary complications.  No specific patient specific risk factors identified. Postoperative process was discussed including postoperative rehab protocol for both meniscus repair or partial meniscectomy.   After thorough discussion regarding options patient elected to continue with PT at this time as he feels he has been making good progress. I feel this is reasonable, though we did discuss  that with meniscus tear untreated they can at times cause chondral irritation and damage to the associated compartment. However, we likewise discussed that he has likely always had a discoid meniscus, was asymptomatic up until recently, he has already been doing better with conservative care, and meniscal deficiency, especially in lateral compartment, also predisposes to arthritis. Given these details, he will continue to modify his activity and go to PT and will plan for early follow-up after PT completion to discuss continued plan for nonoperative care vs transition to surgical intervention.       Return in about 4 weeks (around 4/3/2025) for Recheck.    _____________________________________________________  CHIEF COMPLAINT:  Chief Complaint   Patient presents with    Right Knee - Follow-up       SUBJECTIVE:  Kan Burgess is a 39 y.o. year old male who presents for evaluation of right knee pain.  He is referred from Dr. Daugherty.  The issue began approximately 5 months ago when he twisted his knee while using a ladder.  He had sharp severe pain at that time, which made walking difficult.  The pain has notably subsided since then, but it is still been present.  It mostly gives some difficulty with kneeling or when he goes into deep flexion of the knee.  He localizes the pain to the anterolateral aspect of his knee.  Generally, he is asymptomatic at rest.  He has been in physical therapy and he feels that it has been improving his symptoms.  He presents for consultation after a recent MRI showing that he has a meniscus tear. He has tried wearing a brace and has also tried oral medications, which have given some degree of relief.     PAST MEDICAL HISTORY:  Past Medical History:   Diagnosis Date    Anxiety     Depression     Eating disorder     Mild asthma        PAST SURGICAL HISTORY:  Past Surgical History:   Procedure Laterality Date    KNEE SURGERY Left        FAMILY HISTORY:  Family History   Problem Relation  "Age of Onset    Breast cancer Maternal Grandmother     Cancer Paternal Grandfather        SOCIAL HISTORY:  Social History     Tobacco Use    Smoking status: Former     Current packs/day: 0.25     Average packs/day: 0.3 packs/day for 2.0 years (0.5 ttl pk-yrs)     Types: Cigarettes    Smokeless tobacco: Never    Tobacco comments:     Smoked on and off in early twenties. Smoked again recently but have not in several months   Vaping Use    Vaping status: Never Used   Substance Use Topics    Alcohol use: Not Currently    Drug use: Not Currently       MEDICATIONS:    Current Outpatient Medications:     escitalopram (LEXAPRO) 5 mg tablet, Take 1 tablet (5 mg total) by mouth daily, Disp: 30 tablet, Rfl: 5    methocarbamol (Robaxin-750) 750 mg tablet, Take 1 tablet (750 mg total) by mouth every 6 (six) hours as needed for muscle spasms (Patient not taking: Reported on 2/5/2025), Disp: 30 tablet, Rfl: 0    naproxen (EC NAPROSYN) 500 MG EC tablet, Take 1 tablet (500 mg total) by mouth 2 (two) times a day with meals for 7 days, Disp: 14 tablet, Rfl: 0    ALLERGIES:  No Known Allergies    Review of systems:   Constitutional: Negative for fatigue, fever or loss of apetite.   HENT: Negative.    Respiratory: Negative for shortness of breath, dyspnea.    Cardiovascular: Negative for chest pain/tightness.   Gastrointestinal: Negative for abdominal pain, N/V.   Endocrine: Negative for cold/heat intolerance, unexplained weight loss/gain.   Genitourinary: Negative for flank pain, dysuria, hematuria.   Musculoskeletal: As in HPI   Skin: Negative for rash.    Neurological: Negative for numbness tingling  Psychiatric/Behavioral: Negative for agitation.  _____________________________________________________  PHYSICAL EXAMINATION:    Height 5' 10\" (1.778 m), weight 105 kg (232 lb).    General: well developed and well nourished, alert, oriented times 3, and appears comfortable  HEENT: Benign, normocephalic, atraumatic  Cardiovascular: " regular rate    Pulmonary: No wheezing or stridor  Abdomen: Soft, Nontender  Skin: No masses, erythema, lacerations, fluctation, ulcerations  Neurovascular: as per MSK exam below    MUSCULOSKELETAL EXAMINATION:  Right Knee  Ambulates with normal gait  No bruising, swelling, deformity  Minimal TTP lateral joint line  Passive ROM 0 - 120, - crepitus  + Jose M's, - Vivi's  Stable to varus/valgus stress at 0 and 30 degrees  Normal Lachman  - Anterior Drawer, - Posterior Drawer  4+/5 quad, 4+/5 hamstring strength  SILT all exposed distal distributions  2+ PT pulse    _____________________________________________________  STUDIES REVIEWED:  Images personally reviewed by me today     MRI of right knee reviewed demonstrating lateral meniscus with complex tearing in setting of what appears to be an incomplete discoid meniscus with a positive both the high sign on sagittal imaging.  Radiology notes small partial thickness articular cartilage defect to central trochlea but chondral surfaces generally appear intact on my review.  Cruciates and collaterals appear intact.  No other significant atypia.    Austen Singh MD

## 2025-03-12 ENCOUNTER — OFFICE VISIT (OUTPATIENT)
Dept: PHYSICAL THERAPY | Facility: CLINIC | Age: 40
End: 2025-03-12
Payer: COMMERCIAL

## 2025-03-12 DIAGNOSIS — M23.91 INTERNAL DERANGEMENT OF RIGHT KNEE: Primary | ICD-10-CM

## 2025-03-12 PROCEDURE — 97110 THERAPEUTIC EXERCISES: CPT

## 2025-03-12 PROCEDURE — 97140 MANUAL THERAPY 1/> REGIONS: CPT

## 2025-03-12 PROCEDURE — 97112 NEUROMUSCULAR REEDUCATION: CPT

## 2025-03-12 NOTE — PROGRESS NOTES
"Daily Note     Today's date: 3/12/2025  Patient name: Kan Burgess  : 1985  MRN: [id number]  Referring provider: Maryellen Vargas PA-C  Dx:   Encounter Diagnosis     ICD-10-CM    1. Internal derangement of right knee  M23.91                      Subjective:  Patient reports that he feels the strength is improving and that is helping.  Kan notes that he has occasion lateral knee pain which does subside quickly.       Objective: See treatment diary below      Assessment: Patient tolerated treatment well. Patient performed ex and received manual therapy as noted. Patient demonstrated a good understanding of the exercises performed.  Patient noted mild lateral knee discomfort with SL TKE which resolved immediately following completion.  Patient would benefit from continued PT intervention to address deficits and attain set goals.       Plan: Continue per plan of care.      Precautions: none.     [email or web address]  Access Code: [email or web address]    POC expires Unit limit Auth Expiration date PT/OT/ST + Visit Limit?   25 4 N/a BOMN                           Visit/Unit Tracking  AUTH Status:  Date 2/5 2/12 2/26 3/5 3/12          N/a Used 1 2 3 4  5           Remaining                     Manuals 2/5 2/12 2/26 3/5  3/12        IASTM- R lat knee 10'  10' 10'  10'  10'        R Ant tibial glide    3'                                    Neuro Re-Ed             Quad set  HEP instruction             TKE Green 3\"x20 Into SLB  Green 2x10  Into SLB green 2x10  Into SLB green 2x10  Into SLB green  2x10        SLB w rebounder toss  Bball 2x10 Yel MB 2x10 ea           Biodex LOS  Lv 12 floor/foam x 3 ea  Lv 12 foam x5  Lv 10 foam x 4  Lv 10 foam  x4                                               Ther Ex             PT Edu  KS KS KS KS         Recum bike  5 min 7 min  10 min  10 min  10 min        Standing hip abd/ext green x10 ea  Home  Green 2x10 ea  Home  HEP        Side step w band   NV Green 20 ft x 4  " "Home  HEP        LAQ   2# 2x10   SAQ 2# 2x10 ea  SAQ  2#  2x10 ea        SHC   Green 2x10            Side lying hip abd   2# 2x10            Mini squat   2x10            TRX retrolunge   2x10  2x10  2x10  2x10        Hamstring stretch at step    4x20\"           SLR    2# 2x10 ea  2#  2x10 ea        Prone hip ext     2# 2x10 ea  2#  2x10 ea        Adductor set     3\"X20  3\"x20        Ther Activity             Leg press    115# x10; 95# x10  95# 2x10  95#  3x10                     Gait Training                                       Modalities                                              "

## 2025-03-13 NOTE — ASSESSMENT & PLAN NOTE
Discussed history, exam, and imaging with patient. Presentation most consistent with lateral meniscus tear in setting of discoid meniscus.  Discussed meniscus pathology and its function as a shock absorber for the knee and to improve congruity of lateral compartment. Discussed management options  Conservative treatment would entail continued activity modification as well as physical therapy to strengthen muscles about the knee as well as to the core to aid in symptomatic relief.  Bracing could also be considered to assist with stability and/or offload the associated compartment.  Oral/topical medications or Corticosteroid vs PRP injections can be used for supplemental pain relief.  The benefit of conservative treatment would be avoiding the risks of surgery and risk of anesthesia.  The downside to conservative management is potential failure to obtain relief of symptoms.   Surgical intervention was likewise discussed in the form of right knee diagnostic arthroscopy with partial meniscectomy/saucerization and possible meniscus repair.  Risks of surgery include bleeding, infection, damage to local surrounding structures, iatrogenic intra-articular injury, failure of repair, retear, unstable meniscus, continued pain, development of arthritis, inability to return to activity at the same level, DVT/PE and possible need for repeat surgery.  Risks of anesthesia were likewise discussed including cardiovascular or pulmonary complications.  No specific patient specific risk factors identified. Postoperative process was discussed including postoperative rehab protocol for both meniscus repair or partial meniscectomy.   After thorough discussion regarding options patient elected to continue with PT at this time as he feels he has been making good progress. I feel this is reasonable, though we did discuss that with meniscus tear untreated they can at times cause chondral irritation and damage to the associated compartment.  However, we likewise discussed that he has likely always had a discoid meniscus, was asymptomatic up until recently, he has already been doing better with conservative care, and meniscal deficiency, especially in lateral compartment, also predisposes to arthritis. Given these details, he will continue to modify his activity and go to PT and will plan for early follow-up after PT completion to discuss continued plan for nonoperative care vs transition to surgical intervention.

## 2025-03-19 ENCOUNTER — OFFICE VISIT (OUTPATIENT)
Dept: PHYSICAL THERAPY | Facility: CLINIC | Age: 40
End: 2025-03-19
Payer: COMMERCIAL

## 2025-03-19 DIAGNOSIS — M23.91 INTERNAL DERANGEMENT OF RIGHT KNEE: Primary | ICD-10-CM

## 2025-03-19 PROCEDURE — 97112 NEUROMUSCULAR REEDUCATION: CPT | Performed by: PHYSICAL THERAPIST

## 2025-03-19 PROCEDURE — 97110 THERAPEUTIC EXERCISES: CPT | Performed by: PHYSICAL THERAPIST

## 2025-03-19 PROCEDURE — 97140 MANUAL THERAPY 1/> REGIONS: CPT | Performed by: PHYSICAL THERAPIST

## 2025-03-19 NOTE — PROGRESS NOTES
"Daily Note     Today's date: 3/19/2025  Patient name: Kan Burgess  : 1985  MRN: [id number]  Referring provider: Maryellen Vargas PA-C  Dx:   Encounter Diagnosis     ICD-10-CM    1. Internal derangement of right knee  M23.91                      Subjective:  Patient states he is doing better overall.  He's having less pain, but it's not resolved.       Objective: See treatment diary below      Assessment: Patient tolerated treatment well. Patient able to progress exercise program to include SL RDL, step up onto Bosu, mini squat on overturned bosu.  No increased pain during session.  Recommend continuing next week, as scheduled, and that he will follow-up with physician as scheduled.  No complaints post session.      Plan: Continue per plan of care.      Precautions: none.     [email or web address]  Access Code: [email or web address]    POC expires Unit limit Auth Expiration date PT/OT/ST + Visit Limit?   25 4 N/a BOMN                           Visit/Unit Tracking  AUTH Status:  Date 2/5 2/12 2/26 3/5 3/12 3/19          N/a Used 1 2 3 4  5 6          Remaining                     Manuals 2/5 2/12 2/26 3/5  3/12 3/19        IASTM- R lat knee 10'  10' 10'  10'  10' 10'        R Ant tibial glide    3'                                    Neuro Re-Ed             Quad set  HEP instruction             TKE Green 3\"x20 Into SLB  Green 2x10  Into SLB green 2x10  Into SLB green 2x10  Into SLB green  2x10 Home        SLB w rebounder toss  Bball 2x10 Yel MB 2x10 ea    Yel 2x10 ea        Biodex LOS  Lv 12 floor/foam x 3 ea  Lv 12 foam x5  Lv 10 foam x 4  Lv 10 foam  x4 Lv 10 foam x 5        SL RDL       10# DB 2x10 ea                                  Ther Ex             PT Edu  KS KS KS KS  KS       Recum bike for strength  5 min 7 min  10 min  10 min  10 min L2 10 min        Standing hip abd/ext green x10 ea  Home  Green 2x10 ea  Home  HEP        Side step w band   NV Green 20 ft x 4  Home  HEP Green 20 ft x 4     " "   LAQ   2# 2x10   SAQ 2# 2x10 ea  SAQ  2#  2x10 ea        SHC   Green 2x10            Side lying hip abd   2# 2x10            Mini squat   2x10     On overturned bosu 2x10 ea       TRX retrolunge   2x10  2x10  2x10  2x10 2x10 ea       Hamstring stretch at step    4x20\"    PRN        SLR    2# 2x10 ea  2#  2x10 ea        Prone hip ext     2# 2x10 ea  2#  2x10 ea        Adductor set     3\"X20  3\"x20        Ther Activity             Leg press    115# x10; 95# x10  95# 2x10  95#  3x10 105# 3x10        Step up on bosu       2x10 ea       Gait Training                                       Modalities                                              "

## 2025-03-26 ENCOUNTER — OFFICE VISIT (OUTPATIENT)
Dept: PHYSICAL THERAPY | Facility: CLINIC | Age: 40
End: 2025-03-26
Payer: COMMERCIAL

## 2025-03-26 DIAGNOSIS — M23.91 INTERNAL DERANGEMENT OF RIGHT KNEE: Primary | ICD-10-CM

## 2025-03-26 PROCEDURE — 97530 THERAPEUTIC ACTIVITIES: CPT | Performed by: PHYSICAL THERAPIST

## 2025-03-26 PROCEDURE — 97110 THERAPEUTIC EXERCISES: CPT | Performed by: PHYSICAL THERAPIST

## 2025-03-26 PROCEDURE — 97140 MANUAL THERAPY 1/> REGIONS: CPT | Performed by: PHYSICAL THERAPIST

## 2025-03-26 NOTE — PROGRESS NOTES
PT Re-Evaluation  and PT Discharge    Today's date: 3/26/2025  Patient name: Kan Burgess  : 1985  MRN: [id number]  Referring provider: Maryellen Vargas PA-C  Dx:   Encounter Diagnosis     ICD-10-CM    1. Internal derangement of right knee  M23.91                      Assessment  Impairments: abnormal or restricted ROM, activity intolerance, impaired physical strength, lacks appropriate home exercise program and pain with function    Assessment details: Patient is a 38 y/o male with improved right knee pain.  Since starting therapy, he presents with less pain, improved knee strength, and improved activity tolerance.  Patient is independent with HEP and in agreement to d/c to HEP.  He is instructed to call clinic with questions or concerns.   Understanding of Dx/Px/POC: good     Prognosis: good    Goals  STG within 4 weeks:   1. Patient to be independent in HEP.  MET   2. Reduce pain by 50% to improve quality of life.  MET  3. Improve knee strength to 5/5 without pain.  MET   LTG within 8 weeks:   1. Patient to be independent in ADLs/IADLs without difficulty. IN PROGRESS  2. Patient to be able to squat with correct technique without pain. MET   3. Patient to be independent with comprehensive HEP.  MET     Plan  Patient would benefit from: skilled physical therapy and PT eval  Planned modality interventions: cryotherapy, hydrotherapy and unattended electrical stimulation    Planned therapy interventions: therapeutic training, therapeutic exercise, therapeutic activities, stretching, strengthening, postural training, patient education, neuromuscular re-education, manual therapy, joint mobilization, IADL retraining, activity modification, ADL retraining, ADL training, body mechanics training, flexibility, functional ROM exercises, gait training, graded activity, graded exercise, graded motor and home exercise program    Treatment plan discussed with: patient  Plan details: Patient to continue with HEP; he  will call clinic with questions or concerns.        Subjective Evaluation    History of Present Illness  Mechanism of injury: Patient is a 40 y/o male with chief complaints of right knee pain that began in 2024 when climbing a ladder.  Patient states his knee is popping; no clicking and no giving out noted.  His pain hasn't improved much and he was seen by ortho.  He is referred for MRI and PT.  He arrives today for evaluation and treatment of right knee.      3/26: Patient states he messaged his ortho to say that he wants to continue with his PT regimen.  He declines further intervention, including surgery, at this time.  He is overall slightly improved since starting therapy and having less pain.  He was able to run a few weeks ago with less pain than previously.   Patient Goals  Patient goals for therapy: decreased pain  Patient goal: avoid of surgery  Pain  At best pain ratin  At worst pain ratin  Quality: dull ache  Alleviating factors: nothing relieves the pain.  Exacerbated by: twisting the knee, bending, not moving for a period of time, going from gas to break.  Progression: improved    Social Support    Employment status: working (drive truck)  Exercise history: none      Diagnostic Tests  X-ray: normal  Treatments  No previous or current treatments      Objective     Active Range of Motion   Left Knee   Flexion: 130 degrees   Extension: 0 degrees   Extensor la degrees     Right Knee   Flexion: 130 degrees   Extension: 0 degrees   Extensor la degrees     Strength/Myotome Testing     Left Knee   Flexion: 4+  Extension: 4+    Right Knee   Flexion: 5  Extension: 5  Quadriceps contraction: good    Tests     Right Knee   Positive Thessaly's test at 5 degrees and Thessaly's test at 20 degrees.   Negative anterior drawer, anterior Lachman, lateral Vivi, medial Vivi, posterior drawer, posterior Lachman, valgus stress test at 0 degrees, valgus stress test at 30 degrees, varus stress  "test at 0 degrees and varus stress test at 30 degrees.              Precautions: none.     [email or web address]  Access Code: [email or web address]    POC expires Unit limit Auth Expiration date PT/OT/ST + Visit Limit?   4/2/25 4 N/a BOMN                           Visit/Unit Tracking  AUTH Status:  Date 2/5 2/12 2/26 3/5 3/12 3/19  3/26         N/a Used 1 2 3 4  5 6 7         Remaining                     Manuals 2/5 2/12 2/26 3/5  3/12 3/19  3/26      IASTM- R lat knee 10'  10' 10'  10'  10' 10'  10'       R Ant tibial glide    3'                                    Neuro Re-Ed             Quad set  HEP instruction             TKE Green 3\"x20 Into SLB  Green 2x10  Into SLB green 2x10  Into SLB green 2x10  Into SLB green  2x10 Home        SLB w rebounder toss  Bball 2x10 Yel MB 2x10 ea    Yel 2x10 ea  Yel 2x10 ea       Biodex LOS  Lv 12 floor/foam x 3 ea  Lv 12 foam x5  Lv 10 foam x 4  Lv 10 foam  x4 Lv 10 foam x 5        SL RDL       10# DB 2x10 ea  15# DB 2x10 ea                                 Ther Ex             PT Edu  KS KS KS KS  KS KS & d/c eval       Recum bike for strength  5 min 7 min  10 min  10 min  10 min L2 10 min  L2 10 min       Standing hip abd/ext green x10 ea  Home  Green 2x10 ea  Home  HEP        Side step w band   NV Green 20 ft x 4  Home  HEP Green 20 ft x 4        LAQ   2# 2x10   SAQ 2# 2x10 ea  SAQ  2#  2x10 ea        SHC   Green 2x10            Side lying hip abd   2# 2x10            Mini squat   2x10     On overturned bosu 2x10 ea HEP review; on overturned bosu 2x10       TRX retrolunge   2x10  2x10  2x10  2x10 2x10 ea       Hamstring stretch at step    4x20\"    PRN  PRN      SLR    2# 2x10 ea  2#  2x10 ea        Prone hip ext     2# 2x10 ea  2#  2x10 ea        Adductor set     3\"X20  3\"x20        Ther Activity             Leg press    115# x10; 95# x10  95# 2x10  95#  3x10 105# 3x10  105# x10; 115# x10; 125# x10       Step up on bosu       2x10 ea 2x15 ea       Line hops- side/side, " fwd/bwd        X20 ea       Forward hop to SL        2x10       Squat jump        HEP instruction       Gait Training                                       Modalities

## 2025-04-05 ENCOUNTER — PATIENT MESSAGE (OUTPATIENT)
Dept: INTERNAL MEDICINE CLINIC | Facility: CLINIC | Age: 40
End: 2025-04-05

## 2025-04-05 DIAGNOSIS — F43.23 ADJUSTMENT DISORDER WITH MIXED ANXIETY AND DEPRESSED MOOD: ICD-10-CM

## 2025-04-08 DIAGNOSIS — F43.23 ADJUSTMENT DISORDER WITH MIXED ANXIETY AND DEPRESSED MOOD: ICD-10-CM

## 2025-04-08 RX ORDER — ESCITALOPRAM OXALATE 5 MG/1
5 TABLET ORAL DAILY
Qty: 30 TABLET | Refills: 5 | Status: SHIPPED | OUTPATIENT
Start: 2025-04-08

## 2025-04-09 RX ORDER — ESCITALOPRAM OXALATE 5 MG/1
5 TABLET ORAL DAILY
Qty: 30 TABLET | Refills: 0 | OUTPATIENT
Start: 2025-04-09